# Patient Record
Sex: MALE | Race: OTHER | NOT HISPANIC OR LATINO | Employment: OTHER | URBAN - METROPOLITAN AREA
[De-identification: names, ages, dates, MRNs, and addresses within clinical notes are randomized per-mention and may not be internally consistent; named-entity substitution may affect disease eponyms.]

---

## 2017-12-05 ENCOUNTER — OFFICE VISIT (OUTPATIENT)
Dept: INTERNAL MEDICINE | Facility: CLINIC | Age: 47
End: 2017-12-05

## 2017-12-05 VITALS
HEIGHT: 66 IN | SYSTOLIC BLOOD PRESSURE: 143 MMHG | TEMPERATURE: 98 F | BODY MASS INDEX: 34.9 KG/M2 | HEART RATE: 63 BPM | WEIGHT: 217.13 LBS | DIASTOLIC BLOOD PRESSURE: 85 MMHG

## 2017-12-05 DIAGNOSIS — R06.2 WHEEZING: ICD-10-CM

## 2017-12-05 DIAGNOSIS — I10 ESSENTIAL HYPERTENSION: ICD-10-CM

## 2017-12-05 DIAGNOSIS — Z72.0 TOBACCO USE: ICD-10-CM

## 2017-12-05 DIAGNOSIS — Z00.00 ANNUAL PHYSICAL EXAM: Primary | ICD-10-CM

## 2017-12-05 PROCEDURE — 99386 PREV VISIT NEW AGE 40-64: CPT | Mod: S$PBB,,, | Performed by: INTERNAL MEDICINE

## 2017-12-05 PROCEDURE — 99999 PR PBB SHADOW E&M-NEW PATIENT-LVL III: CPT | Mod: PBBFAC,,, | Performed by: INTERNAL MEDICINE

## 2017-12-05 PROCEDURE — 99203 OFFICE O/P NEW LOW 30 MIN: CPT | Mod: PBBFAC,PO | Performed by: INTERNAL MEDICINE

## 2017-12-05 RX ORDER — AMLODIPINE BESYLATE 5 MG/1
TABLET ORAL
Refills: 0 | COMMUNITY
Start: 2017-11-30 | End: 2017-12-05 | Stop reason: DRUGHIGH

## 2017-12-05 RX ORDER — AMLODIPINE BESYLATE 10 MG/1
10 TABLET ORAL DAILY
Qty: 30 TABLET | Refills: 11 | Status: SHIPPED | OUTPATIENT
Start: 2017-12-05 | End: 2018-01-05 | Stop reason: SDUPTHER

## 2017-12-05 RX ORDER — IBUPROFEN 600 MG/1
TABLET ORAL
Refills: 0 | COMMUNITY
Start: 2017-11-30 | End: 2019-11-26

## 2017-12-05 NOTE — PATIENT INSTRUCTIONS
HYPERTENSION (High Blood Pressure)  High blood pressure is one of the silent killers.  It usually causes no symptoms, but it can cause serious problems if left untreated--such as stroke, heart failure, heart attack, vision problems and kidney failure.    Lifestyle Changes are FUNDAMENTAL!  *Lower your salt intake.  Ideally <2g (2000mg) per day, but any amount of decrease will help.  Don't smoke cigarettes or use any tobacco product.  Lose weight if you're overweight.  Exercise >30 min per day most days of the week  Eat a healthy diet that includes lots of fruits and vegetables and is low in fat.  Limit your alcohol and caffeine intake.    Blood pressure monitor  [employer] brand monitors are consistently good, but whichever brand you get, I recommend the arm monitor, not the wrist kind (tend to be inaccurate).  Available in any local drugstore.      Monitoring Your Blood Pressure    Both numbers are important, so if 1 number is too high, we should adjust your medication.  Keep a log of the pressures and what time of day you took them.  Even if you've been doing well for a while, still check maybe once a week just to make sure since changes can occur at any time.      Don't Forget Your Eye Exam!  *Make sure to get a dilated Eye Exam yearly to check for any damage from high blood pressure.  Don't wait till you have vision problems, or it may be much harder or even impossible to treat at that point!        Let me know if you would be interested in [facility]'s digital HTN program. You can find out more information here: [email or web address]

## 2017-12-05 NOTE — PROGRESS NOTES
Subjective:       Patient ID: Jasper Sarabia is a 47 y.o. male.    Chief Complaint: Annual Exam    HPI   Started on amlodipine 5mg at [facility] about 3 days ago.     47 y.o. male here for annual exam.     Lipid disorders/ASCVD risk (ages >/= 45 or >/= 20 if increased risk ): labs recently performed- will review records    DM (>45y yearly or if obese, HTN): A1c  labs recently performed- will review records    Eye exam: 2 years ago - due- pt will schedule    Vaccines:   Influenza (yearly) declines   Tetanus (every 10 yrs - 1st tdap) pt unsure    PPSV23(>64yo or <65 w/ lung dz, smoking, DM) declines       Exercise: soccer, gym  Diet: varied       Past Medical History:   Diagnosis Date    Hypertension      Past Surgical History:   Procedure Laterality Date    BACK SURGERY  1997     Family History   Problem Relation Age of Onset    Parkinsonism Mother      Social History     Social History    Marital status:      Spouse name: N/A    Number of children: N/A    Years of education: N/A     Occupational History    Not on file.     Social History Main Topics    Smoking status: Current Every Day Smoker     Packs/day: 1.00     Years: 15.00    Smokeless tobacco: Never Used    Alcohol use Yes      Comment: 2 drinks per day    Drug use: No    Sexual activity: Not on file     Other Topics Concern    Not on file     Social History Narrative    Owns [employer]     Review of patient's allergies indicates:  No Known Allergies    Current Outpatient Prescriptions:     ibuprofen (ADVIL,MOTRIN) 600 MG tablet, TK 1 T PO  Q 8 H PRN, Disp: , Rfl: 0    amLODIPine (NORVASC) 10 MG tablet, Take 1 tablet (10 mg total) by mouth once daily., Disp: 30 tablet, Rfl: 11          Review of Systems   Constitutional: Negative for fatigue and unexpected weight change.   HENT: Negative for dental problem, sinus pain and trouble swallowing.    Eyes: Negative for discharge and redness.   Respiratory: Negative for cough and  "shortness of breath.    Cardiovascular: Negative for chest pain and leg swelling.   Gastrointestinal: Negative for abdominal pain, blood in stool, constipation and diarrhea.   Genitourinary: Negative for difficulty urinating, dysuria and frequency.   Musculoskeletal: Negative for gait problem and joint swelling.   Skin: Negative for pallor, rash and wound.   Neurological: Negative for numbness and headaches.       Objective:        Vitals:    12/05/17 0959   BP: (!) 143/85   BP Location: Right arm   Patient Position: Sitting   BP Method: Medium (Automatic)   Pulse: 63   Temp: 97.7 °F (36.5 °C)   TempSrc: Oral   Weight: 98.5 kg (217 lb 2.5 oz)   Height: 5' 6" (1.676 m)       Body mass index is 35.05 kg/m².    Physical Exam   Constitutional: He is oriented to person, place, and time. He appears well-developed. No distress.   HENT:   Head: Normocephalic and atraumatic.   Right Ear: Tympanic membrane normal.   Left Ear: Tympanic membrane normal.   Nose: Nose normal.   Mouth/Throat: Oropharynx is clear and moist.   Eyes: Conjunctivae and EOM are normal. Pupils are equal, round, and reactive to light.   Neck: Normal range of motion. Neck supple. No tracheal deviation present. No thyromegaly present.   Cardiovascular: Normal rate, regular rhythm, normal heart sounds and intact distal pulses.    Pulmonary/Chest: Effort normal. He has wheezes in the right upper field, the right middle field, the right lower field, the left upper field, the left middle field and the left lower field.   Abdominal: Soft. He exhibits no distension and no mass. There is no tenderness.   Musculoskeletal: Normal range of motion. He exhibits no edema.   Lymphadenopathy:     He has no cervical adenopathy.   Neurological: He is alert and oriented to person, place, and time. No sensory deficit.   Skin: Skin is warm and dry. He is not diaphoretic. No cyanosis. Nails show no clubbing.   Psychiatric: He has a normal mood and affect. His behavior is " normal. Judgment normal.       Assessment:     1. Annual physical exam    2. Essential hypertension    3. Wheezing    4. Tobacco use           Plan:         1. Annual physical exam  - declines vaccines  - will review labs recently performed at [facility] - may still need lipid panel, A1c, CMP    2. Essential hypertension  - stop smoking, resume exercise, low salt diet.   - BP log given. Pt will let me know if persistently >130/80. RTC 4 weeks with BP log.  - information about HTN and [facility] digital monitoring program provided w/ AVS  - amLODIPine (NORVASC) 10 MG tablet; Take 1 tablet (10 mg total) by mouth once daily.  Dispense: 30 tablet; Refill: 11    3. Wheezing  - pt asymptomatic but diffuse wheezing - will get PFTs to eval lung etiology vs upper airway etiology (vocal cord dysfxn)  - Complete PFT with bronchodilator; Future  - PULSE OXIMETRY WITH REST - PULM; Future    4. Tobacco use  - discussed stop smoking w/ pt - he declines smoking cessation referral  - Complete PFT with bronchodilator; Future  - PULSE OXIMETRY WITH REST - PULM; Future      RTC 4 wks w/ BP log

## 2018-01-05 ENCOUNTER — OFFICE VISIT (OUTPATIENT)
Dept: INTERNAL MEDICINE | Facility: CLINIC | Age: 48
End: 2018-01-05

## 2018-01-05 VITALS
TEMPERATURE: 98 F | SYSTOLIC BLOOD PRESSURE: 144 MMHG | WEIGHT: 216.5 LBS | BODY MASS INDEX: 34.79 KG/M2 | RESPIRATION RATE: 16 BRPM | HEIGHT: 66 IN | HEART RATE: 77 BPM | DIASTOLIC BLOOD PRESSURE: 88 MMHG

## 2018-01-05 DIAGNOSIS — I10 ESSENTIAL HYPERTENSION: Primary | ICD-10-CM

## 2018-01-05 DIAGNOSIS — R06.2 WHEEZING: ICD-10-CM

## 2018-01-05 PROCEDURE — 99999 PR PBB SHADOW E&M-EST. PATIENT-LVL III: CPT | Mod: PBBFAC,,, | Performed by: INTERNAL MEDICINE

## 2018-01-05 PROCEDURE — 99213 OFFICE O/P EST LOW 20 MIN: CPT | Mod: PBBFAC,PO | Performed by: INTERNAL MEDICINE

## 2018-01-05 PROCEDURE — 99213 OFFICE O/P EST LOW 20 MIN: CPT | Mod: S$PBB,,, | Performed by: INTERNAL MEDICINE

## 2018-01-05 RX ORDER — AMLODIPINE BESYLATE 10 MG/1
10 TABLET ORAL DAILY
Qty: 90 TABLET | Refills: 3 | Status: SHIPPED | OUTPATIENT
Start: 2018-01-05 | End: 2019-11-26

## 2019-11-26 ENCOUNTER — OFFICE VISIT (OUTPATIENT)
Dept: INTERNAL MEDICINE | Facility: CLINIC | Age: 49
End: 2019-11-26
Payer: COMMERCIAL

## 2019-11-26 VITALS
HEART RATE: 66 BPM | WEIGHT: 224.44 LBS | OXYGEN SATURATION: 98 % | HEIGHT: 66 IN | SYSTOLIC BLOOD PRESSURE: 145 MMHG | DIASTOLIC BLOOD PRESSURE: 80 MMHG | BODY MASS INDEX: 36.07 KG/M2

## 2019-11-26 DIAGNOSIS — Z76.89 ESTABLISHING CARE WITH NEW DOCTOR, ENCOUNTER FOR: Primary | ICD-10-CM

## 2019-11-26 DIAGNOSIS — I10 ESSENTIAL HYPERTENSION: ICD-10-CM

## 2019-11-26 PROCEDURE — 3079F DIAST BP 80-89 MM HG: CPT | Mod: CPTII,S$GLB,, | Performed by: STUDENT IN AN ORGANIZED HEALTH CARE EDUCATION/TRAINING PROGRAM

## 2019-11-26 PROCEDURE — 99999 PR PBB SHADOW E&M-EST. PATIENT-LVL III: ICD-10-PCS | Mod: PBBFAC,,, | Performed by: STUDENT IN AN ORGANIZED HEALTH CARE EDUCATION/TRAINING PROGRAM

## 2019-11-26 PROCEDURE — 3077F PR MOST RECENT SYSTOLIC BLOOD PRESSURE >= 140 MM HG: ICD-10-PCS | Mod: CPTII,S$GLB,, | Performed by: STUDENT IN AN ORGANIZED HEALTH CARE EDUCATION/TRAINING PROGRAM

## 2019-11-26 PROCEDURE — 99396 PREV VISIT EST AGE 40-64: CPT | Mod: S$GLB,,, | Performed by: STUDENT IN AN ORGANIZED HEALTH CARE EDUCATION/TRAINING PROGRAM

## 2019-11-26 PROCEDURE — 3077F SYST BP >= 140 MM HG: CPT | Mod: CPTII,S$GLB,, | Performed by: STUDENT IN AN ORGANIZED HEALTH CARE EDUCATION/TRAINING PROGRAM

## 2019-11-26 PROCEDURE — 99999 PR PBB SHADOW E&M-EST. PATIENT-LVL III: CPT | Mod: PBBFAC,,, | Performed by: STUDENT IN AN ORGANIZED HEALTH CARE EDUCATION/TRAINING PROGRAM

## 2019-11-26 PROCEDURE — 99396 PR PREVENTIVE VISIT,EST,40-64: ICD-10-PCS | Mod: S$GLB,,, | Performed by: STUDENT IN AN ORGANIZED HEALTH CARE EDUCATION/TRAINING PROGRAM

## 2019-11-26 PROCEDURE — 3079F PR MOST RECENT DIASTOLIC BLOOD PRESSURE 80-89 MM HG: ICD-10-PCS | Mod: CPTII,S$GLB,, | Performed by: STUDENT IN AN ORGANIZED HEALTH CARE EDUCATION/TRAINING PROGRAM

## 2019-11-26 RX ORDER — LOSARTAN POTASSIUM 50 MG/1
25 TABLET ORAL DAILY
Qty: 45 TABLET | Refills: 3 | Status: SHIPPED | OUTPATIENT
Start: 2019-11-26 | End: 2020-11-20 | Stop reason: SDUPTHER

## 2019-11-26 NOTE — PATIENT INSTRUCTIONS
FASTING LABS (LIPID & A1C)    METABOLIC PANEL IN 5-6 WEEKS    FOLLOW UP APPOINTMENT IN 7 WEEKS WITH DR. VEGA

## 2019-11-26 NOTE — PROGRESS NOTES
Clinic Note  11/26/2019      Subjective:       Patient ID: Jasper Sarabia is a 49 y.o. male being seen to establish care    Chief Complaint: Establish Care    Patient is 49 year old male with history of HTN (not on medication, but previously on amlodipine) and stroke in 2018 (with no residual deficits, possibly hemorrhagic) s/p endovascular ?repair/coiling and then open brain surgery 2019 for shunt placement. All procedures performed at [facility] and thus there are no documents available for review at this time. Patient has no complaints today and is transitioning care to [facility] from [facility]. Denies chest pain, shortness of breath, nausea, vomiting, bowel habit changes or residual deficits from CVA.    #HTN- patient was previously on amlodipine but stopped taking it due to dizziness associated with standing (likely orthostatic hypotension)    #Smoking- patient has approx 20 pack year history of smoking and quit in 2016.     #Diet and exercise- patient eats 2 meals a day and limits carbohydrates to only breakfast. He walks daily (60283 step daily, per Apple Watch), but has no formal exercise plan.           Past Medical History:   Diagnosis Date    Hemorrhagic stroke 2018    Hypertension        Past Surgical History:   Procedure Laterality Date    BACK SURGERY  1997    BRAIN SURGERY  2018    endovascular repair of aneurysm    BRAIN SURGERY  2019    shunt placement, open surgery       Family History   Problem Relation Age of Onset    Parkinsonism Mother     Diabetes Sister        Social History     Socioeconomic History    Marital status:      Spouse name: Not on file    Number of children: Not on file    Years of education: Not on file    Highest education level: Not on file   Occupational History    Occupation: Self-employed, [employer]   Social Needs    Financial resource strain: Not on file    Food insecurity:     Worry: Not on file     Inability: Not on file    Transportation needs:     Medical: Not  on file     Non-medical: Not on file   Tobacco Use    Smoking status: Former Smoker     Packs/day: 1.00     Years: 20.00     Pack years: 20.00    Smokeless tobacco: Never Used    Tobacco comment: quit 2016   Substance and Sexual Activity    Alcohol use: Yes     Frequency: 2-4 times a month     Comment: 2 drinks per day    Drug use: No    Sexual activity: Not on file   Lifestyle    Physical activity:     Days per week: Not on file     Minutes per session: Not on file    Stress: Not on file   Relationships    Social connections:     Talks on phone: Not on file     Gets together: Not on file     Attends Anglican service: Not on file     Active member of club or organization: Not on file     Attends meetings of clubs or organizations: Not on file     Relationship status: Not on file   Other Topics Concern    Not on file   Social History Narrative    Owns [employer]       Patient's Medications   New Prescriptions    LOSARTAN (COZAAR) 50 MG TABLET    Take 0.5 tablets (25 mg total) by mouth once daily.   Previous Medications    No medications on file   Modified Medications    No medications on file   Discontinued Medications    AMLODIPINE (NORVASC) 10 MG TABLET    Take 1 tablet (10 mg total) by mouth once daily.    IBUPROFEN (ADVIL,MOTRIN) 600 MG TABLET    TK 1 T PO  Q 8 H PRN       Patient Active Problem List    Diagnosis Date Noted    Essential hypertension 12/05/2017    Tobacco use 12/05/2017    Wheezing 12/05/2017       Review of Systems   Constitutional: Negative for chills, fever, malaise/fatigue and weight loss.   Respiratory: Negative for cough and shortness of breath.    Cardiovascular: Negative for chest pain and leg swelling.   Gastrointestinal: Negative for abdominal pain, constipation, diarrhea, heartburn, nausea and vomiting.   Genitourinary: Negative.    Musculoskeletal: Negative.    Neurological: Negative for dizziness, focal weakness, seizures, loss of consciousness, weakness and  "headaches.   Psychiatric/Behavioral: Negative.            Objective:      BP (!) 145/80 (BP Location: Left arm, Patient Position: Sitting, BP Method: Large (Manual))   Pulse 66   Ht 5' 6" (1.676 m)   Wt 101.8 kg (224 lb 6.9 oz)   SpO2 98%   BMI 36.22 kg/m²   Body mass index is 36.22 kg/m².    Physical Exam   Constitutional: He is oriented to person, place, and time. He appears well-developed and well-nourished. No distress.   HENT:   Patient with coronal surgical scar and R sided cranial shunt placed.    Eyes: Pupils are equal, round, and reactive to light. Conjunctivae are normal.   Cardiovascular: Normal rate, regular rhythm and normal heart sounds.   Pulmonary/Chest: Effort normal and breath sounds normal.   Abdominal: Soft. Bowel sounds are normal. There is no tenderness.   Musculoskeletal: He exhibits no edema or tenderness.   Neurological: He is alert and oriented to person, place, and time.   Psychiatric: He has a normal mood and affect. His behavior is normal.   Nursing note and vitals reviewed.      Assessment:         1. Establishing care with new doctor, encounter for  Lipid panel    Hemoglobin A1c   2. Essential hypertension  losartan (COZAAR) 50 MG tablet    Basic metabolic panel         Plan:         Jasper Sarabia is a 49 y.o. male being seen to establish care.     Jasper was seen today for establish care.    Diagnoses and all orders for this visit:    Establishing care with new doctor, encounter for  -     Lipid panel; Future  -     Hemoglobin A1c; Future  - Return to clinic in ~7 weeks with [facility] desirae and appropriate referral to NSGY vs [facility] for stroke follow up    Essential hypertension  -     losartan (COZAAR) 50 MG tablet; Take 0.5 tablets (25 mg total) by mouth once daily.  -     Basic metabolic panel; prior to next visit          Patient seen and plan of care discussed with Dr. Small.     Ita Garcia MD    I have discussed A/P with Dr Garcia and agree with plan of " action.  Clement Cifuentes

## 2019-12-02 ENCOUNTER — LAB VISIT (OUTPATIENT)
Dept: LAB | Facility: HOSPITAL | Age: 49
End: 2019-12-02
Attending: STUDENT IN AN ORGANIZED HEALTH CARE EDUCATION/TRAINING PROGRAM
Payer: COMMERCIAL

## 2019-12-02 ENCOUNTER — TELEPHONE (OUTPATIENT)
Dept: CARDIOLOGY | Facility: HOSPITAL | Age: 49
End: 2019-12-02

## 2019-12-02 DIAGNOSIS — I10 ESSENTIAL HYPERTENSION: ICD-10-CM

## 2019-12-02 DIAGNOSIS — E78.5 HYPERLIPIDEMIA ASSOCIATED WITH TYPE 2 DIABETES MELLITUS: ICD-10-CM

## 2019-12-02 DIAGNOSIS — E11.69 HYPERLIPIDEMIA ASSOCIATED WITH TYPE 2 DIABETES MELLITUS: ICD-10-CM

## 2019-12-02 DIAGNOSIS — Z76.89 ESTABLISHING CARE WITH NEW DOCTOR, ENCOUNTER FOR: ICD-10-CM

## 2019-12-02 DIAGNOSIS — E11.9 DIABETES MELLITUS WITHOUT COMPLICATION: Primary | ICD-10-CM

## 2019-12-02 LAB
ANION GAP SERPL CALC-SCNC: 9 MMOL/L (ref 8–16)
BUN SERPL-MCNC: 14 MG/DL (ref 6–20)
CALCIUM SERPL-MCNC: 9.6 MG/DL (ref 8.7–10.5)
CHLORIDE SERPL-SCNC: 108 MMOL/L (ref 95–110)
CHOLEST SERPL-MCNC: 217 MG/DL (ref 120–199)
CHOLEST/HDLC SERPL: 5.4 {RATIO} (ref 2–5)
CO2 SERPL-SCNC: 23 MMOL/L (ref 23–29)
CREAT SERPL-MCNC: 0.8 MG/DL (ref 0.5–1.4)
EST. GFR  (AFRICAN AMERICAN): >60 ML/MIN/1.73 M^2
EST. GFR  (NON AFRICAN AMERICAN): >60 ML/MIN/1.73 M^2
ESTIMATED AVG GLUCOSE: 200 MG/DL (ref 68–131)
GLUCOSE SERPL-MCNC: 185 MG/DL (ref 70–110)
HBA1C MFR BLD HPLC: 8.6 % (ref 4–5.6)
HDLC SERPL-MCNC: 40 MG/DL (ref 40–75)
HDLC SERPL: 18.4 % (ref 20–50)
LDLC SERPL CALC-MCNC: 115 MG/DL (ref 63–159)
NONHDLC SERPL-MCNC: 177 MG/DL
POTASSIUM SERPL-SCNC: 4 MMOL/L (ref 3.5–5.1)
SODIUM SERPL-SCNC: 140 MMOL/L (ref 136–145)
TRIGL SERPL-MCNC: 310 MG/DL (ref 30–150)

## 2019-12-02 PROCEDURE — 36415 COLL VENOUS BLD VENIPUNCTURE: CPT

## 2019-12-02 PROCEDURE — 83036 HEMOGLOBIN GLYCOSYLATED A1C: CPT

## 2019-12-02 PROCEDURE — 80048 BASIC METABOLIC PNL TOTAL CA: CPT

## 2019-12-02 PROCEDURE — 80061 LIPID PANEL: CPT

## 2019-12-02 RX ORDER — ATORVASTATIN CALCIUM 40 MG/1
40 TABLET, FILM COATED ORAL DAILY
Qty: 90 TABLET | Refills: 3 | Status: SHIPPED | OUTPATIENT
Start: 2019-12-02 | End: 2020-01-14 | Stop reason: SDUPTHER

## 2019-12-02 RX ORDER — METFORMIN HYDROCHLORIDE 500 MG/1
500 TABLET, EXTENDED RELEASE ORAL
Qty: 90 TABLET | Refills: 3 | Status: SHIPPED | OUTPATIENT
Start: 2019-12-02 | End: 2021-04-14 | Stop reason: SDUPTHER

## 2019-12-02 NOTE — TELEPHONE ENCOUNTER
Patient with A1C 8.6% and ASCVD risk score of 10.4% of cardiovascular event in the next 10 years.     Will start patient on metformin XR 500mg daily and atorvastatin 40mg daily. Will also order diabetic education and DME diabetic supplies for patient.    Already have scheduled follow up with patient to establish care, will reassess lipid profile and A1C at that time. Called patient to report results and new medications. Patient expressed understanding. Will keep next appointment for January. Patient encouraged to call in with any further questions or concerns.     BOOGIE Garcia MD

## 2020-01-14 ENCOUNTER — OFFICE VISIT (OUTPATIENT)
Dept: INTERNAL MEDICINE | Facility: CLINIC | Age: 50
End: 2020-01-14
Payer: COMMERCIAL

## 2020-01-14 VITALS
OXYGEN SATURATION: 98 % | BODY MASS INDEX: 40.24 KG/M2 | DIASTOLIC BLOOD PRESSURE: 66 MMHG | HEIGHT: 64 IN | HEART RATE: 71 BPM | SYSTOLIC BLOOD PRESSURE: 148 MMHG | WEIGHT: 235.69 LBS

## 2020-01-14 DIAGNOSIS — E11.69 HYPERLIPIDEMIA ASSOCIATED WITH TYPE 2 DIABETES MELLITUS: ICD-10-CM

## 2020-01-14 DIAGNOSIS — E78.5 HYPERLIPIDEMIA ASSOCIATED WITH TYPE 2 DIABETES MELLITUS: ICD-10-CM

## 2020-01-14 DIAGNOSIS — Z98.890 HISTORY OF BRAIN SURGERY: ICD-10-CM

## 2020-01-14 DIAGNOSIS — K21.9 GASTROESOPHAGEAL REFLUX DISEASE, ESOPHAGITIS PRESENCE NOT SPECIFIED: ICD-10-CM

## 2020-01-14 DIAGNOSIS — E11.9 DIABETES MELLITUS WITHOUT COMPLICATION: Primary | ICD-10-CM

## 2020-01-14 PROCEDURE — 99213 PR OFFICE/OUTPT VISIT, EST, LEVL III, 20-29 MIN: ICD-10-PCS | Mod: S$GLB,,, | Performed by: STUDENT IN AN ORGANIZED HEALTH CARE EDUCATION/TRAINING PROGRAM

## 2020-01-14 PROCEDURE — 3008F BODY MASS INDEX DOCD: CPT | Mod: CPTII,S$GLB,, | Performed by: STUDENT IN AN ORGANIZED HEALTH CARE EDUCATION/TRAINING PROGRAM

## 2020-01-14 PROCEDURE — 3078F PR MOST RECENT DIASTOLIC BLOOD PRESSURE < 80 MM HG: ICD-10-PCS | Mod: CPTII,S$GLB,, | Performed by: STUDENT IN AN ORGANIZED HEALTH CARE EDUCATION/TRAINING PROGRAM

## 2020-01-14 PROCEDURE — 3078F DIAST BP <80 MM HG: CPT | Mod: CPTII,S$GLB,, | Performed by: STUDENT IN AN ORGANIZED HEALTH CARE EDUCATION/TRAINING PROGRAM

## 2020-01-14 PROCEDURE — 3077F SYST BP >= 140 MM HG: CPT | Mod: CPTII,S$GLB,, | Performed by: STUDENT IN AN ORGANIZED HEALTH CARE EDUCATION/TRAINING PROGRAM

## 2020-01-14 PROCEDURE — 3077F PR MOST RECENT SYSTOLIC BLOOD PRESSURE >= 140 MM HG: ICD-10-PCS | Mod: CPTII,S$GLB,, | Performed by: STUDENT IN AN ORGANIZED HEALTH CARE EDUCATION/TRAINING PROGRAM

## 2020-01-14 PROCEDURE — 99213 OFFICE O/P EST LOW 20 MIN: CPT | Mod: S$GLB,,, | Performed by: STUDENT IN AN ORGANIZED HEALTH CARE EDUCATION/TRAINING PROGRAM

## 2020-01-14 PROCEDURE — 99999 PR PBB SHADOW E&M-EST. PATIENT-LVL IV: CPT | Mod: PBBFAC,,, | Performed by: STUDENT IN AN ORGANIZED HEALTH CARE EDUCATION/TRAINING PROGRAM

## 2020-01-14 PROCEDURE — 3008F PR BODY MASS INDEX (BMI) DOCUMENTED: ICD-10-PCS | Mod: CPTII,S$GLB,, | Performed by: STUDENT IN AN ORGANIZED HEALTH CARE EDUCATION/TRAINING PROGRAM

## 2020-01-14 PROCEDURE — 99999 PR PBB SHADOW E&M-EST. PATIENT-LVL IV: ICD-10-PCS | Mod: PBBFAC,,, | Performed by: STUDENT IN AN ORGANIZED HEALTH CARE EDUCATION/TRAINING PROGRAM

## 2020-01-14 RX ORDER — PANTOPRAZOLE SODIUM 40 MG/1
40 TABLET, DELAYED RELEASE ORAL DAILY
Qty: 90 TABLET | Refills: 3 | Status: SHIPPED | OUTPATIENT
Start: 2020-01-14 | End: 2023-11-16

## 2020-01-14 RX ORDER — ATORVASTATIN CALCIUM 40 MG/1
40 TABLET, FILM COATED ORAL DAILY
Qty: 90 TABLET | Refills: 3 | Status: SHIPPED | OUTPATIENT
Start: 2020-01-14 | End: 2023-11-16

## 2020-01-14 NOTE — PATIENT INSTRUCTIONS
Low-Salt Diet  This diet removes foods that are high in salt. It also limits the amount of salt you use when cooking. It is most often used for people with high blood pressure, edema (fluid retention), and kidney, liver, or heart disease.  Table salt contains the mineral sodium. Your body needs sodium to work normally. But too much sodium can make your health problems worse. Your healthcare provider is recommending a low-salt (also called low-sodium) diet for you. Your total daily allowance of salt is 1,500 to 2,300 milligrams (mg). It is less than 1 teaspoon of table salt. This means you can have only about 500 to 700 mg of sodium at each meal. People with certain health problems should limit salt intake to the lower end of the recommended range.    When you cook, dont add much salt. If you can cook without using salt, even better. Dont add salt to your food at the table.  When shopping, read food labels. Salt is often called sodium on the label. Choose foods that are salt-free, low salt, or very low salt. Note that foods with reduced salt may not lower your salt intake enough.    Beans, potatoes, and pasta  Ok: Dry beans, split peas, lentils, potatoes, rice, macaroni, pasta, spaghetti without added salt  Avoid: Potato chips, tortilla chips, and similar products  Breads and cereals  Ok: Low-sodium breads, rolls, cereals, and cakes; low-salt crackers, matzo crackers  Avoid: Salted crackers, pretzels, popcorn, Albanian toast, pancakes, muffins  Dairy  Ok: Milk, chocolate milk, hot chocolate mix, low-salt cheeses, and yogurt  Avoid: Processed cheese and cheese spreads; Roquefort, Camembert, and cottage cheese; buttermilk, instant breakfast drink  Desserts  Ok: Ice cream, frozen yogurt, juice bars, gelatin, cookies and pies, sugar, honey, jelly, hard candy  Avoid: Most pies, cakes and cookies prepared or processed with salt; instant pudding  Drinks  Ok: Tea, coffee, fizzy (carbonated) drinks, juices  Avoid: Flavored  coffees, electrolyte replacement drinks, sports drinks  Meats  Ok: All fresh meat, fish, poultry, low-salt tuna, eggs, egg substitute  Avoid: Smoked, pickled, brine-cured, or salted meats and fish. This includes brambila, chipped beef, corned beef, hot dogs, deli meats, ham, kosher meats, salt pork, sausage, canned tuna, salted codfish, smoked salmon, herring, sardines, or anchovies.  Seasonings and spices  Ok: Most seasonings are okay. Good substitutes for salt include: fresh herb blends, hot sauce, lemon, garlic, roe, vinegar, dry mustard, parsley, cilantro, horseradish, tomato paste, regular margarine, mayonnaise, unsalted butter, cream cheese, vegetable oil, cream, low-salt salad dressing and gravy.  Avoid: Regular ketchup, relishes, pickles, soy sauce, teriyaki sauce, Worcestershire sauce, BBQ sauce, tartar sauce, meat tenderizer, chili sauce, regular gravy, regular salad dressing, salted butter  Soups  Ok: Low-salt soups and broths made with allowed foods  Avoid: Bouillon cubes, soups with smoked or salted meats, regular soup and broth  Vegetables  Ok: Most vegetables are okay; also low-salt tomato and vegetable juices  Avoid: Sauerkraut and other brine-soaked vegetables; pickles and other pickled vegetables; tomato juice, olives  Date Last Reviewed: 8/1/2016 © 2000-2017 [employer]. [address] [place] [address]. All rights reserved. This information is not intended as a substitute for professional medical care. Always follow your healthcare professional's instructions.        Eating Heart-Healthy Foods  Eating has a big impact on your heart health. In fact, eating healthier can improve several of your heart risks at once. For instance, it helps you manage weight, cholesterol, and blood pressure. Here are ideas to help you make heart-healthy changes without giving up all the foods and flavors you love.  Getting started  · Talk with your health care provider about eating plans, such  as the DASH or Mediterranean diet. You may also be referred to a dietitian.  · Change a few things at a time. Give yourself time to get used to a few eating changes before adding more.  · Work to create a tasty, healthy eating plan that you can stick to for the rest of your life.    Goals for healthy eating  Below are some tips to improve your eating habits:  · Limit saturated fats and trans fats. Saturated fats raise your levels of cholesterol, so keep these fats to a minimum. They are found in foods such as fatty meats, whole milk, cheese, and palm and coconut oils. Avoid trans fats because they lower good cholesterol as well as raise bad cholesterol. Trans fats are most often found in processed foods.  · Reduce sodium (salt) intake. Eating too much salt may increase your blood pressure. Limit your sodium intake to 2,300 milligrams (mg) per day, or less if your health care provider recommends it. Dining out less often and eating fewer processed foods are two great ways to decrease the amount of salt you consume.  · Managing calories. A calorie is a unit of energy. Your body burns calories for fuel, but if you eat more calories than your body burns, the extras are stored as fat. Your health care provider can help you create a diet plan to manage your calories. This will likely include eating healthier foods as well as exercising regularly. To help you track your progress, keep a diary to record what you eat and how often you exercise.  Choose the right foods  Aim to make these foods staples of your diet. If you have diabetes, you may have different recommendations than what is listed here:  · Fruits and vegetable provide plenty of nutrients without a lot of calories. At meals, fill half your plate with these foods. Split the other half of your plate between whole grains and lean protein.  · Whole grains are high in fiber and rich in vitamins and nutrients. Good choices include whole-wheat bread, pasta, and brown  rice.  · Lean proteins give you nutrition with less fat. Good choices include fish, skinless chicken, and beans.  · Low-fat or nonfat dairy provides nutrients without a lot of fat. Try low-fat or nonfat milk, cheese, or yogurt.  · Healthy fats can be good for you in small amounts. These are unsaturated fats, such as olive oil, nuts, and fish. Try to have at least 2 servings per week of fatty fish such as salmon, sardines, mackerel, rainbow trout, and albacore tuna. These contain omega-3 fatty acids, which are good for your heart. Flaxseed is another source of a heart-healthy fat.  More on heart healthy eating    Read food labels  Healthy eating starts at the grocery store. Be sure to pay attention to food labels on packaged foods. Look for products that are high in fiber and protein, and low in saturated fat, cholesterol, and sodium. Avoid products that contain trans fat. And pay close attention to serving size. For instance, if you plan to eat two servings, double all the numbers on the label.  Prepare food right  A key part of healthy cooking is cutting down on added fat and salt. Look on the internet for lower-fat, lower-sodium recipes. Also, try these tips:  · Remove fat from meat and skin from poultry before cooking.  · Skim fat from the surface of soups and sauces.  · Broil, boil, bake, steam, grill, and microwave food without added fats.  · Choose ingredients that spice up your food without adding calories, fat, or sodium. Try these items: horseradish, hot sauce, lemon, mustard, nonfat salad dressings, and vinegar. For salt-free herbs and spices, try basil, cilantro, cinnamon, pepper, and rosemary.  Date Last Reviewed: 6/25/2015  © 1220-2517 [employer]. [address] [place] [address], [place] [address]. All rights reserved. This information is not intended as a substitute for professional medical care. Always follow your healthcare professional's instructions.

## 2020-01-14 NOTE — PROGRESS NOTES
Clinic Note  01/14/2020      Subjective:       Patient ID: Jasper Sarabia is a 49 y.o. male being seen for an established visit.    Chief Complaint: Follow-up      49 year old male with HTN, newly diagnosed DM on last visit (A1C 8.6%) and stroke in 2018 without residual deficits (thought to be hemorrhagic) and s/p endovascular repair/coiling and then open shunt placement in 2018 and second procedure in 7/2019 for a second aneurysm found in angiogram, but nothing was found in the procedure (reported by patient). Patient moved his care over to [facility] in 11/2019 from [facility]. Today patient complains of some pain above his R ear, near the sight of a brain surgery.     Had this before, weather, pressure, no sleep. 2/10, related to high BP    #HTN - patient was previously on amlodipine and experienced dizziness. Was placed on losartan 25mg daily at last visit. Today his BP is 144/66. Patient has been logging his blood pressures at home, they have ranged from 120mmHg to 135 mmHg systolic, with very few readings ranging 140-145mmHg. Patient reports being compliant with his medications.    #DM - patient with Hb A1C 8.6% on last visit. He was started on metformin on the last visit and has been compliant with his medications. He has not been to diabetic education at this point and has been checking his blood glucose at home and before he eats and states that it ranges around 100-140. Patient has been walking 3 miles nearly every day. Patient has not been taking his simvastatin because of issues with his insurance.       Review of Systems   Constitutional: Negative for fever.   HENT: Negative for ear discharge, ear pain, facial swelling, hearing loss and mouth sores.         R ear pressure   Eyes: Negative.    Respiratory: Negative for cough and shortness of breath.    Cardiovascular: Negative for chest pain and leg swelling.   Gastrointestinal: Negative for abdominal distention and abdominal pain.   Endocrine: Negative.   "  Genitourinary: Negative.    Musculoskeletal: Negative.    Skin: Negative.    Allergic/Immunologic: Negative.    Neurological: Negative.    Psychiatric/Behavioral: Negative.        Patient's Medications   New Prescriptions    PANTOPRAZOLE (PROTONIX) 40 MG TABLET    Take 1 tablet (40 mg total) by mouth once daily.   Previous Medications    LOSARTAN (COZAAR) 50 MG TABLET    Take 0.5 tablets (25 mg total) by mouth once daily.    METFORMIN (GLUCOPHAGE-XR) 500 MG 24 HR TABLET    Take 1 tablet (500 mg total) by mouth daily with breakfast.   Modified Medications    Modified Medication Previous Medication    ATORVASTATIN (LIPITOR) 40 MG TABLET atorvastatin (LIPITOR) 40 MG tablet       Take 1 tablet (40 mg total) by mouth once daily.    Take 1 tablet (40 mg total) by mouth once daily.   Discontinued Medications    No medications on file       Patient Active Problem List    Diagnosis Date Noted    Diabetes mellitus without complication 12/02/2019    Hyperlipidemia associated with type 2 diabetes mellitus 12/02/2019    Essential hypertension 12/05/2017    Tobacco use 12/05/2017    Wheezing 12/05/2017           Objective:      BP (!) 148/66   Pulse 71   Ht 5' 4" (1.626 m)   Wt 106.9 kg (235 lb 10.8 oz)   SpO2 98%   BMI 40.45 kg/m²   Estimated body mass index is 40.45 kg/m² as calculated from the following:    Height as of this encounter: 5' 4" (1.626 m).    Weight as of this encounter: 106.9 kg (235 lb 10.8 oz).    Physical Exam   Constitutional: He is oriented to person, place, and time. He appears well-developed and well-nourished. No distress.   HENT:   Right Ear: External ear normal.   Left Ear: External ear normal.   Mouth/Throat: No oropharyngeal exudate.   R sided cranial shunt. Healed coronal surgical scar. Bilateral EAM clear with normal TMs.    Eyes: Pupils are equal, round, and reactive to light. Conjunctivae are normal.   Cardiovascular: Normal rate, regular rhythm and normal heart sounds. "   Pulmonary/Chest: Effort normal and breath sounds normal.   Abdominal: Soft. Bowel sounds are normal. There is no tenderness.   Neurological: He is alert and oriented to person, place, and time.   Psychiatric: He has a normal mood and affect. His behavior is normal. Judgment and thought content normal.   Nursing note and vitals reviewed.      Assessment and Plan:   Jasper was seen today for follow-up.    Diagnoses and all orders for this visit:    Diabetes mellitus without complication  -     atorvastatin (LIPITOR) 40 MG tablet; Take 1 tablet (40 mg total) by mouth once daily.  -     Ambulatory consult to Diabetic Education; Future  - Follow up CBC, BMP, A1C in 6 weeks    Hyperlipidemia associated with type 2 diabetes mellitus  -     atorvastatin (LIPITOR) 40 MG tablet; Take 1 tablet (40 mg total) by mouth once daily.    Gastroesophageal reflux disease, esophagitis presence not specified  -     pantoprazole (PROTONIX) 40 MG tablet; Take 1 tablet (40 mg total) by mouth once daily.    History of brain surgery  -     Ambulatory consult to Neurosurgery      Will attempt to obtain patient records from [facility]. Encouraged patient to bring in records/imaging. RTC in 6 weeks.     Patient seen and plan of care discussed with Dr. Davi Garcia MD

## 2020-01-14 NOTE — PROGRESS NOTES
Reviewed patient's medical record in Epic. Patient's history and physical reviewed and discussed, please refer to resident physician's note for specific details. I agree with resident's assessment and plan.    Alex Tomlinson MD

## 2020-02-19 ENCOUNTER — TELEPHONE (OUTPATIENT)
Dept: NEUROSURGERY | Facility: CLINIC | Age: 50
End: 2020-02-19

## 2020-02-19 NOTE — TELEPHONE ENCOUNTER
----- Message from Rose Verma sent at 2/19/2020  9:14 AM CST -----  Contact: pt @ [phone]  Calling to speak with someone in Dr. Patton's regarding his appt. Please call.

## 2020-05-20 ENCOUNTER — TELEPHONE (OUTPATIENT)
Dept: NEUROSURGERY | Facility: CLINIC | Age: 50
End: 2020-05-20

## 2020-05-20 NOTE — TELEPHONE ENCOUNTER
"----- Message from Raisa Nolen sent at 5/20/2020  1:30 PM CDT -----  Patient Assist/ Scheduling     Name of caller:  Jasper   Provider name: Abdi DE ANDA MD   Contact Preference:  [phone]  Is this regarding current patient or new patient?: current   What is the nature of the call?    - pt states that he has been experiencing headaches lately,   some coughing and shortness of breathe. Last appt were   cancelled and will need to be r/s Please call and advise.     Additional Notes:   "Thank you for all that you do for our patients'"     "

## 2020-06-04 ENCOUNTER — OFFICE VISIT (OUTPATIENT)
Dept: NEUROSURGERY | Facility: CLINIC | Age: 50
End: 2020-06-04
Payer: COMMERCIAL

## 2020-06-04 VITALS
HEIGHT: 64 IN | DIASTOLIC BLOOD PRESSURE: 93 MMHG | TEMPERATURE: 98 F | SYSTOLIC BLOOD PRESSURE: 155 MMHG | BODY MASS INDEX: 36.7 KG/M2 | HEART RATE: 64 BPM | WEIGHT: 215 LBS

## 2020-06-04 DIAGNOSIS — I60.8 SUBARACHNOID HEMORRHAGE DUE TO CEREBRAL ANEURYSM: Primary | ICD-10-CM

## 2020-06-04 PROCEDURE — 99204 PR OFFICE/OUTPT VISIT, NEW, LEVL IV, 45-59 MIN: ICD-10-PCS | Mod: S$GLB,,, | Performed by: NEUROLOGICAL SURGERY

## 2020-06-04 PROCEDURE — 3008F PR BODY MASS INDEX (BMI) DOCUMENTED: ICD-10-PCS | Mod: CPTII,S$GLB,, | Performed by: NEUROLOGICAL SURGERY

## 2020-06-04 PROCEDURE — 99204 OFFICE O/P NEW MOD 45 MIN: CPT | Mod: S$GLB,,, | Performed by: NEUROLOGICAL SURGERY

## 2020-06-04 PROCEDURE — 3008F BODY MASS INDEX DOCD: CPT | Mod: CPTII,S$GLB,, | Performed by: NEUROLOGICAL SURGERY

## 2020-06-04 PROCEDURE — 99999 PR PBB SHADOW E&M-EST. PATIENT-LVL III: ICD-10-PCS | Mod: PBBFAC,,, | Performed by: NEUROLOGICAL SURGERY

## 2020-06-04 PROCEDURE — 3077F SYST BP >= 140 MM HG: CPT | Mod: CPTII,S$GLB,, | Performed by: NEUROLOGICAL SURGERY

## 2020-06-04 PROCEDURE — 99999 PR PBB SHADOW E&M-EST. PATIENT-LVL III: CPT | Mod: PBBFAC,,, | Performed by: NEUROLOGICAL SURGERY

## 2020-06-04 PROCEDURE — 3080F PR MOST RECENT DIASTOLIC BLOOD PRESSURE >= 90 MM HG: ICD-10-PCS | Mod: CPTII,S$GLB,, | Performed by: NEUROLOGICAL SURGERY

## 2020-06-04 PROCEDURE — 3077F PR MOST RECENT SYSTOLIC BLOOD PRESSURE >= 140 MM HG: ICD-10-PCS | Mod: CPTII,S$GLB,, | Performed by: NEUROLOGICAL SURGERY

## 2020-06-04 PROCEDURE — 3080F DIAST BP >= 90 MM HG: CPT | Mod: CPTII,S$GLB,, | Performed by: NEUROLOGICAL SURGERY

## 2020-06-04 NOTE — LETTER
June 4, 2020      Ita Garcia MD  [address]  [facility] [address]           [place] - Neurosurgery 7th Fl  [address]  [facility] [address]  Phone: [phone]  Fax: [phone]          Patient: Jasper Sarabia   MR Number: [id number]   YOB: 1970   Date of Visit: 6/4/2020       Dear Dr. Ita Garcia:    Thank you for referring Jasper Sarabia to me for evaluation. Attached you will find relevant portions of my assessment and plan of care.    If you have questions, please do not hesitate to call me. I look forward to following Jasper Sarabia along with you.    Sincerely,    Oj Patton MD    Enclosure  CC:  No Recipients    If you would like to receive this communication electronically, please contact [email or web address] or [phone] to request more information on [employer] access.    For providers and/or their staff who would like to refer a patient to [facility], please contact us through our one-stop-shop provider referral line, [facility] [place], at [phone].    If you feel you have received this communication in error or would no longer like to receive these types of communications, please e-mail [email or web address]

## 2020-06-04 NOTE — PROGRESS NOTES
This 50-year-old man was admitted to [facility] in May, 2018 with a ruptured anterior communicating artery aneurysm.  This was treated with endovascular coil embolization on 05/25/18 and 6/14/18, and a  shunt was placed on 06/07/2018.  The patient returned for follow-up angiography a year later.  The primary aneurysm seem to be well coiled but there was a question of a small infundibulum adjacent to the primary aneurysm.  He underwent craniotomy and exploration of the anterior communicating artery area on 07/22/2019.  It was found that there was a fenestration of the right A2 artery with branches coming from this and this was wrapped with cotton pledgets.  The patient has done well neurologically.  He has transferred his care from [facility] to [facility] and was seen this morning to establish neurosurgical followup care here.  The patient feels generally fatigued more than he has in the past.  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  Headaches are not a primary problem.  He does find if he turns suddenly that he has a feeling of pressure in his head and some imbalance.  Eye sight and hearing have remained stable.  He has had no speech difficulty.  There has been no weakness and numbness of the extremities.  Past medical history includes diabetes managed with Glucophage.  Review of systems is otherwise unremarkable.    On physical examination he is a well-developed, well-nourished, [place]- [place] man who is alert and cooperative.  Examination of the head shows a well-healed incision.  The shunt reservoir is in the right frontal area.  This pumps and refills easily.  Eyes show full extraocular movements, the pupils are equal reactive, fundi show clear disc margins.  Hearing is intact to finger rubbing bilaterally.  The neck is supple.  On neurological examination he is speaking clearly.  Provides a reasonable history.  Finger-to-nose is done well and gait is unremarkable.  Cranial  nerves are otherwise intact with normal facial sensation and movement.  Strength in the extremities is good, sensation intact, and deep tendon reflexes symmetrical.    MRI of the brain was done at [facility] on 10/31/2019.  Ventricular size is normal.  The coils are noted in the anterior communicating artery aneurysm.  There is some FLAIR intensity and contrast enhancement around this.  There are no new complicating factors.    Impression anterior communicating artery aneurysm with subarachnoid hemorrhage.  Status post endovascular coiling and craniotomy for wrapping.     Recommendation:  He seems to have recovered well from the subarachnoid hemorrhage and various procedures.  I will have CT of the head repeated in about 1 year to check  shunt function.

## 2020-06-30 ENCOUNTER — HOSPITAL ENCOUNTER (OUTPATIENT)
Dept: RADIOLOGY | Facility: HOSPITAL | Age: 50
Discharge: HOME | End: 2020-06-30
Attending: NEUROLOGICAL SURGERY
Payer: COMMERCIAL

## 2020-06-30 DIAGNOSIS — I60.8 SUBARACHNOID HEMORRHAGE DUE TO CEREBRAL ANEURYSM: ICD-10-CM

## 2020-06-30 PROCEDURE — 70450 CT HEAD/BRAIN W/O DYE: CPT | Mod: 26,,, | Performed by: RADIOLOGY

## 2020-06-30 PROCEDURE — 70450 CT HEAD WITHOUT CONTRAST: ICD-10-PCS | Mod: 26,,, | Performed by: RADIOLOGY

## 2020-06-30 PROCEDURE — 70450 CT HEAD/BRAIN W/O DYE: CPT | Mod: TC

## 2020-11-20 DIAGNOSIS — I10 ESSENTIAL HYPERTENSION: ICD-10-CM

## 2020-11-20 NOTE — TELEPHONE ENCOUNTER
----- Message from Bianca Barraza sent at 11/20/2020  8:18 AM CST -----  Contact: [phone] (Phone  Requesting an RX refill or new RX.  Is this a refill or new RX: Refill 1   RX name and strength:losartan (COZAAR) 50 MG tablet  Is this a 30 day or 90 day RX:   Pharmacy name and phone # (copy/paste from chart):  [facility] [address] [place] [address] [phone] (Phone)  [phone] (Fax)  Comments:

## 2020-11-22 RX ORDER — LOSARTAN POTASSIUM 50 MG/1
25 TABLET ORAL DAILY
Qty: 45 TABLET | Refills: 3 | Status: SHIPPED | OUTPATIENT
Start: 2020-11-22 | End: 2023-11-16

## 2021-04-13 ENCOUNTER — PATIENT MESSAGE (OUTPATIENT)
Dept: INTERNAL MEDICINE | Facility: CLINIC | Age: 51
End: 2021-04-13

## 2021-04-13 DIAGNOSIS — E11.9 DIABETES MELLITUS WITHOUT COMPLICATION: ICD-10-CM

## 2021-04-14 RX ORDER — METFORMIN HYDROCHLORIDE 500 MG/1
500 TABLET, EXTENDED RELEASE ORAL
Qty: 90 TABLET | Refills: 3 | Status: SHIPPED | OUTPATIENT
Start: 2021-04-14 | End: 2023-11-16

## 2021-04-16 ENCOUNTER — PATIENT MESSAGE (OUTPATIENT)
Dept: RESEARCH | Facility: HOSPITAL | Age: 51
End: 2021-04-16

## 2023-07-28 NOTE — PROGRESS NOTES
"Subjective:       Patient ID: Jasper Sarabia is a 47 y.o. male.    Chief Complaint: Follow-up (4 wk)    HPI    47 y.o. male presents for follow up of chronic medical problems including HTN, tobacco use    HTN - Patient is currently on amlodipine 10mg daily (takes sometimes). He does check his BP at home (sometimes), and it runs [id number] SBP (on medicaiton) and 130s-150s SBP (off medication). Side effects of medications note: none. He denies headaches, blurred vision, dizziness, chest pain, shortness of breath, nausea.    Tobacco use - he decreased from 1PPD to 1/2 PPD since last appt.   Review of Systems   Constitutional: Negative for activity change and unexpected weight change.   Eyes: Negative for visual disturbance.   Respiratory: Negative for chest tightness and shortness of breath.    Cardiovascular: Negative for chest pain and leg swelling.   Gastrointestinal: Negative for abdominal pain and nausea.   Genitourinary: Negative for difficulty urinating.   Musculoskeletal: Negative for arthralgias and myalgias.   Skin: Negative for color change.   Allergic/Immunologic: Negative for immunocompromised state.   Neurological: Negative for weakness, numbness and headaches.   Hematological: Does not bruise/bleed easily.       Objective:        Vitals:    01/05/18 1013   BP: (!) 144/88   BP Location: Right arm   Patient Position: Sitting   BP Method: Medium (Automatic)   Pulse: 77   Resp: 16   Temp: 97.9 °F (36.6 °C)   TempSrc: Oral   Weight: 98.2 kg (216 lb 7.9 oz)   Height: 5' 6" (1.676 m)       Body mass index is 34.94 kg/m².    Physical Exam   Constitutional: He is oriented to person, place, and time. He appears well-developed and well-nourished. No distress.   HENT:   Head: Normocephalic and atraumatic.   Nose: Nose normal.   Eyes: Conjunctivae and EOM are normal. Right eye exhibits no discharge. Left eye exhibits no discharge.   Neck: Normal range of motion. Neck supple.   Cardiovascular: Normal rate, regular rhythm, " Molecular Tumor Board Review Summary    Patient: Yash Cervantes  [facility] Date: 7/28/2023  Type of Discussion: Full discussion  Status Decline: No  Treatment recommended: Yes   Somatic testing recommended: No  NGS reported therapy recommended: Yes    Testing Reviewed:     Large panel: [employer] CDx  Specimen: Pancreas 7/2023  Cancer type: Pancreas    Immunotherapy markers:   MSI by NGS: Stable  MMR by IHC: Intact  PDL-1: QNS  TMB: 1 muts/mb    Additional alterations:       Treatment Recommendations:   Genomic alteration: ERBB2 amplification - equivocal  Therapy target: HER2  Medication recommendation: INV - Pertuzumab + Trastuzumab (TAPUR)  Level of Evidence: C2  Medication discussion Summary: Patient may be a candidate for an arm of the TAPUR trial based on ERBB2 amplification - equivocal finding. More details to come in note from research coordinator. [facility] would recommend pursuing the clinical trial following standard of care therapy.    Positive germline results may affect cancer risk and medical management for the patient and at-risk family members. Referral to a genetic counselor is recommended.    Our research coordinator will review these results independently and communicate any trial options that may exist to your site research team. A separate communication will be sent if the patient requires referral to Genetic Counseling based on these results    The above summary reflects our recommendations for Yash's treatment in the context of their current status and our knowledge at this time. These recommendations may change for subsequent lines of therapy or if new data emerges. If you would like this case re-reviewed in the future please let our team know.     Thank you for allowing the members of our team to participate in the care of your patient.     Amrit Bond RPH  on behalf of the [facility]     Levels of Evidence adapted from the following: OncoKB Therapeutic Levels of  Evidence V2 https://www.oncokb.org/    Level A (FDA-recognized biomarker predictive of response to an FDA-approved drug in this indication)   Level B (Standard care biomarker recommended by NCCN or other professional guidelines predictive of response to an FDA-approved drug in this indication)   Level C1 (Compelling clinical evidence supports the biomarker as being predictive of response to a drug in this indication)   Level C2 (Standard care or investigational biomarker predictive of response to an FDA-approved or investigational drug in another indication)   Level D (Compelling biological evidence supports the biomarker as being predictive of response to a drug)   Level R1 (Standard care biomarker predictive of resistance to an FDA-approved drug in this indication)   Level R2 (Compelling clinical evidence supports the biomarker as being predictive of resistance to a drug)        normal heart sounds and intact distal pulses.    Pulmonary/Chest: Effort normal. No tachypnea. No respiratory distress. He has wheezes (few scattered).   Abdominal: Soft. Bowel sounds are normal.   Musculoskeletal: Normal range of motion. He exhibits no edema.   Neurological: He is alert and oriented to person, place, and time.   Skin: Skin is warm and dry. He is not diaphoretic. No erythema.   Psychiatric: He has a normal mood and affect. His behavior is normal. Thought content normal.       Assessment:     1. Essential hypertension    2. Wheezing           Plan:         1. Essential hypertension  - BP log given. Pt will let me know if persistently >130/80. RTC 2 mo with BP log.  - information about HTN and [facility]'s digital monitoring program provided w/ AVS- encouraged him to sign up for my [facility] or at least purchase cuff since he reports wide variability in his cuff and small size.  - strongly encouraged tobacco cessation, starting exercise regimen, low salt diet, weight loss, decreasing alcohol  - amLODIPine (NORVASC) 10 MG tablet; Take 1 tablet (10 mg total) by mouth once daily.  Dispense: 90 tablet; Refill: 3      2. Wheezing  - he missed appt for PFTs and is not interested right now. Will defer w/u given improved lung exam, decreased tobacco use, and asymptomatic state.

## 2023-11-16 ENCOUNTER — OFFICE VISIT (OUTPATIENT)
Dept: PRIMARY CARE CLINIC | Facility: CLINIC | Age: 53
End: 2023-11-16
Payer: COMMERCIAL

## 2023-11-16 VITALS
TEMPERATURE: 98 F | BODY MASS INDEX: 36 KG/M2 | OXYGEN SATURATION: 97 % | HEART RATE: 71 BPM | HEIGHT: 64 IN | DIASTOLIC BLOOD PRESSURE: 90 MMHG | RESPIRATION RATE: 18 BRPM | SYSTOLIC BLOOD PRESSURE: 166 MMHG | WEIGHT: 210.88 LBS

## 2023-11-16 DIAGNOSIS — Z86.73 HISTORY OF STROKE: ICD-10-CM

## 2023-11-16 DIAGNOSIS — Z11.4 ENCOUNTER FOR SCREENING FOR HIV: ICD-10-CM

## 2023-11-16 DIAGNOSIS — Z51.81 MEDICATION MONITORING ENCOUNTER: ICD-10-CM

## 2023-11-16 DIAGNOSIS — E11.69 TYPE 2 DIABETES MELLITUS WITH HYPERLIPIDEMIA: ICD-10-CM

## 2023-11-16 DIAGNOSIS — Z11.59 NEED FOR HEPATITIS C SCREENING TEST: ICD-10-CM

## 2023-11-16 DIAGNOSIS — Z76.89 ENCOUNTER TO ESTABLISH CARE: Primary | ICD-10-CM

## 2023-11-16 DIAGNOSIS — E78.5 TYPE 2 DIABETES MELLITUS WITH HYPERLIPIDEMIA: ICD-10-CM

## 2023-11-16 DIAGNOSIS — Z00.00 ANNUAL PHYSICAL EXAM: ICD-10-CM

## 2023-11-16 DIAGNOSIS — Z12.11 COLON CANCER SCREENING: ICD-10-CM

## 2023-11-16 DIAGNOSIS — I10 BENIGN ESSENTIAL HTN: ICD-10-CM

## 2023-11-16 PROCEDURE — 1159F PR MEDICATION LIST DOCUMENTED IN MEDICAL RECORD: ICD-10-PCS | Mod: CPTII,S$GLB,, | Performed by: STUDENT IN AN ORGANIZED HEALTH CARE EDUCATION/TRAINING PROGRAM

## 2023-11-16 PROCEDURE — 3080F DIAST BP >= 90 MM HG: CPT | Mod: CPTII,S$GLB,, | Performed by: STUDENT IN AN ORGANIZED HEALTH CARE EDUCATION/TRAINING PROGRAM

## 2023-11-16 PROCEDURE — 3077F SYST BP >= 140 MM HG: CPT | Mod: CPTII,S$GLB,, | Performed by: STUDENT IN AN ORGANIZED HEALTH CARE EDUCATION/TRAINING PROGRAM

## 2023-11-16 PROCEDURE — 99999 PR PBB SHADOW E&M-EST. PATIENT-LVL IV: ICD-10-PCS | Mod: PBBFAC,,, | Performed by: STUDENT IN AN ORGANIZED HEALTH CARE EDUCATION/TRAINING PROGRAM

## 2023-11-16 PROCEDURE — 3077F PR MOST RECENT SYSTOLIC BLOOD PRESSURE >= 140 MM HG: ICD-10-PCS | Mod: CPTII,S$GLB,, | Performed by: STUDENT IN AN ORGANIZED HEALTH CARE EDUCATION/TRAINING PROGRAM

## 2023-11-16 PROCEDURE — 99386 PREV VISIT NEW AGE 40-64: CPT | Mod: S$GLB,,, | Performed by: STUDENT IN AN ORGANIZED HEALTH CARE EDUCATION/TRAINING PROGRAM

## 2023-11-16 PROCEDURE — 99386 PR PREVENTIVE VISIT,NEW,40-64: ICD-10-PCS | Mod: S$GLB,,, | Performed by: STUDENT IN AN ORGANIZED HEALTH CARE EDUCATION/TRAINING PROGRAM

## 2023-11-16 PROCEDURE — 99999 PR PBB SHADOW E&M-EST. PATIENT-LVL IV: CPT | Mod: PBBFAC,,, | Performed by: STUDENT IN AN ORGANIZED HEALTH CARE EDUCATION/TRAINING PROGRAM

## 2023-11-16 PROCEDURE — 3008F BODY MASS INDEX DOCD: CPT | Mod: CPTII,S$GLB,, | Performed by: STUDENT IN AN ORGANIZED HEALTH CARE EDUCATION/TRAINING PROGRAM

## 2023-11-16 PROCEDURE — 3080F PR MOST RECENT DIASTOLIC BLOOD PRESSURE >= 90 MM HG: ICD-10-PCS | Mod: CPTII,S$GLB,, | Performed by: STUDENT IN AN ORGANIZED HEALTH CARE EDUCATION/TRAINING PROGRAM

## 2023-11-16 PROCEDURE — 1160F PR REVIEW ALL MEDS BY PRESCRIBER/CLIN PHARMACIST DOCUMENTED: ICD-10-PCS | Mod: CPTII,S$GLB,, | Performed by: STUDENT IN AN ORGANIZED HEALTH CARE EDUCATION/TRAINING PROGRAM

## 2023-11-16 PROCEDURE — 3008F PR BODY MASS INDEX (BMI) DOCUMENTED: ICD-10-PCS | Mod: CPTII,S$GLB,, | Performed by: STUDENT IN AN ORGANIZED HEALTH CARE EDUCATION/TRAINING PROGRAM

## 2023-11-16 PROCEDURE — 1159F MED LIST DOCD IN RCRD: CPT | Mod: CPTII,S$GLB,, | Performed by: STUDENT IN AN ORGANIZED HEALTH CARE EDUCATION/TRAINING PROGRAM

## 2023-11-16 PROCEDURE — 1160F RVW MEDS BY RX/DR IN RCRD: CPT | Mod: CPTII,S$GLB,, | Performed by: STUDENT IN AN ORGANIZED HEALTH CARE EDUCATION/TRAINING PROGRAM

## 2023-11-16 RX ORDER — LISINOPRIL 10 MG/1
10 TABLET ORAL DAILY
Qty: 90 TABLET | Refills: 3 | Status: SHIPPED | OUTPATIENT
Start: 2023-11-16 | End: 2023-11-30 | Stop reason: SDUPTHER

## 2023-11-16 RX ORDER — METFORMIN HYDROCHLORIDE 1000 MG/1
1000 TABLET ORAL 2 TIMES DAILY WITH MEALS
COMMUNITY

## 2023-11-16 RX ORDER — LANCETS
EACH MISCELLANEOUS
Qty: 100 EACH | Refills: 3 | Status: SHIPPED | OUTPATIENT
Start: 2023-11-16

## 2023-11-16 RX ORDER — ROSUVASTATIN CALCIUM 20 MG/1
20 TABLET, COATED ORAL DAILY
Qty: 90 TABLET | Refills: 3 | Status: SHIPPED | OUTPATIENT
Start: 2023-11-16 | End: 2023-12-12 | Stop reason: SDUPTHER

## 2023-11-16 RX ORDER — INSULIN PUMP SYRINGE, 3 ML
EACH MISCELLANEOUS
Qty: 1 EACH | Refills: 0 | Status: SHIPPED | OUTPATIENT
Start: 2023-11-16 | End: 2024-11-15

## 2023-11-16 NOTE — PROGRESS NOTES
"Subjective:       Patient ID: Jasper Sarabia is a 53 y.o. male.    Chief Complaint: No chief complaint on file.      HPI:  53 y.o. male presents to [facility] to establish care    Acute concerns?: None today's visit    Last PCP?:   Allergies: NKDA  Medical History: HTN, Hemorrhagic stroke, brain aneurysm, T2DM  Medications: metformin 1000 mg bid  Surgical History: Brain surgery, aneurysm repair, open shunt placement to brain, lumbar back surgery  Family History: Sister bladder cancer; no known autoimmune disease  Social History: Quit smoking > 5 years ago, EtOH 3 glasses wine/week, no illicits    Denies family history of thyroid cancer or Multiple Endocrine Neoplasia  No personal history of pancreatitis    Fasting?: No  HIV/Hep C screening?: Amenable  Colonoscopy?: Amenable    Review of Systems   Constitutional:  Negative for chills, diaphoresis, fatigue and fever.   HENT:  Negative for congestion, sinus pressure, sneezing and sore throat.    Respiratory:  Negative for cough and shortness of breath.    Cardiovascular:  Negative for chest pain and palpitations.   Gastrointestinal:  Negative for abdominal pain, diarrhea, nausea and vomiting.   Musculoskeletal:  Negative for arthralgias, joint swelling and myalgias.   Skin:  Negative for rash and wound.   Neurological:  Negative for dizziness, light-headedness and headaches.       Objective:      Vitals:    11/16/23 1347   BP: (!) 170/90   BP Location: Right arm   Patient Position: Sitting   BP Method: Medium (Manual)   Pulse: 71   Resp: 18   Temp: 97.6 °F (36.4 °C)   TempSrc: Temporal   SpO2: 97%   Weight: 95.7 kg (210 lb 13.9 oz)   Height: 5' 4" (1.626 m)     Physical Exam  Vitals reviewed.   Constitutional:       General: He is not in acute distress.     Appearance: Normal appearance. He is not ill-appearing.   HENT:      Head: Normocephalic and atraumatic.   Eyes:      General:         Right eye: No discharge.         Left eye: No discharge.      Conjunctiva/sclera: " "Conjunctivae normal.   Cardiovascular:      Rate and Rhythm: Normal rate and regular rhythm.      Heart sounds: Normal heart sounds. No murmur heard.  Pulmonary:      Effort: Pulmonary effort is normal.      Breath sounds: Normal breath sounds.   Musculoskeletal:         General: No deformity.      Cervical back: Neck supple. No rigidity.   Lymphadenopathy:      Cervical: No cervical adenopathy.   Skin:     General: Skin is warm and dry.      Coloration: Skin is not jaundiced.   Neurological:      General: No focal deficit present.      Mental Status: He is alert and oriented to person, place, and time.   Psychiatric:         Mood and Affect: Mood normal.         Behavior: Behavior normal.             Lab Results   Component Value Date     12/02/2019    K 4.0 12/02/2019     12/02/2019    CO2 23 12/02/2019    BUN 14 12/02/2019    CREATININE 0.8 12/02/2019    ANIONGAP 9 12/02/2019     Lab Results   Component Value Date    HGBA1C 8.6 (H) 12/02/2019     No results found for: "BNP", "BNPTRIAGEBLO"    No results found for: "WBC", "HGB", "HCT", "PLT", "GRAN"  Lab Results   Component Value Date    CHOL 217 (H) 12/02/2019    HDL 40 12/02/2019    LDLCALC 115.0 12/02/2019    TRIG 310 (H) 12/02/2019          Current Outpatient Medications:     metFORMIN (GLUCOPHAGE) 1000 MG tablet, Take 1,000 mg by mouth 2 (two) times daily with meals., Disp: , Rfl:     blood sugar diagnostic Str, To check BG once daily when fasting, to use with insurance preferred meter, Disp: 100 each, Rfl: 3    blood-glucose meter kit, To check BG once daily when fasting, to use with insurance preferred meter, Disp: 1 each, Rfl: 0    lancets Misc, To check BG once daily when fasting, to use with insurance preferred meter, Disp: 100 each, Rfl: 3    lisinopriL 10 MG tablet, Take 1 tablet (10 mg total) by mouth once daily., Disp: 90 tablet, Rfl: 3    rosuvastatin (CRESTOR) 20 MG tablet, Take 1 tablet (20 mg total) by mouth once daily., Disp: 90 " tablet, Rfl: 3        Assessment:       1. Encounter to establish care    2. Annual physical exam    3. Type 2 diabetes mellitus with hyperlipidemia    4. Benign essential HTN    5. Medication monitoring encounter    6. History of stroke    7. Need for hepatitis C screening test    8. Encounter for screening for HIV    9. Colon cancer screening           Plan:       Encounter to establish care  Annual physical exam  Type 2 diabetes mellitus with hyperlipidemia  -     Lipid Panel; Future; Expected date: 11/16/2023  -     rosuvastatin (CRESTOR) 20 MG tablet; Take 1 tablet (20 mg total) by mouth once daily.  Dispense: 90 tablet; Refill: 3  -     Hemoglobin A1C; Future; Expected date: 11/16/2023  -     lisinopriL 10 MG tablet; Take 1 tablet (10 mg total) by mouth once daily.  Dispense: 90 tablet; Refill: 3  -     blood-glucose meter kit; To check BG once daily when fasting, to use with insurance preferred meter  Dispense: 1 each; Refill: 0  -     lancets Misc; To check BG once daily when fasting, to use with insurance preferred meter  Dispense: 100 each; Refill: 3  -     blood sugar diagnostic Strp; To check BG once daily when fasting, to use with insurance preferred meter  Dispense: 100 each; Refill: 3  - Dietary guidance provided today  - Can add GL1-RA if A1c remains significantly elevated on metformin    Benign essential HTN  -     lisinopriL 10 MG tablet; Take 1 tablet (10 mg total) by mouth once daily.  Dispense: 90 tablet; Refill: 3    Medication monitoring encounter  -     CBC Auto Differential; Future; Expected date: 11/16/2023  -     Comprehensive Metabolic Panel; Future; Expected date: 11/16/2023    History of stroke  -     rosuvastatin (CRESTOR) 20 MG tablet; Take 1 tablet (20 mg total) by mouth once daily.  Dispense: 90 tablet; Refill: 3    Need for hepatitis C screening test  -     Hepatitis C Antibody; Future; Expected date: 11/16/2023    Encounter for screening for HIV  -     HIV 1/2 Ag/Ab (4th Gen);  Future; Expected date: 11/16/2023    Colon cancer screening  -     Case Request Endoscopy: COLONOSCOPY    RTC in 3 months

## 2023-11-30 ENCOUNTER — CLINICAL SUPPORT (OUTPATIENT)
Dept: PRIMARY CARE CLINIC | Facility: CLINIC | Age: 53
End: 2023-11-30
Payer: COMMERCIAL

## 2023-11-30 VITALS — OXYGEN SATURATION: 97 % | SYSTOLIC BLOOD PRESSURE: 150 MMHG | HEART RATE: 70 BPM | DIASTOLIC BLOOD PRESSURE: 90 MMHG

## 2023-11-30 DIAGNOSIS — E78.5 TYPE 2 DIABETES MELLITUS WITH HYPERLIPIDEMIA: ICD-10-CM

## 2023-11-30 DIAGNOSIS — E11.69 TYPE 2 DIABETES MELLITUS WITH HYPERLIPIDEMIA: ICD-10-CM

## 2023-11-30 DIAGNOSIS — I10 ESSENTIAL HYPERTENSION: Primary | ICD-10-CM

## 2023-11-30 DIAGNOSIS — I10 BENIGN ESSENTIAL HTN: ICD-10-CM

## 2023-11-30 PROCEDURE — 99999 PR PBB SHADOW E&M-EST. PATIENT-LVL II: ICD-10-PCS | Mod: PBBFAC,,,

## 2023-11-30 PROCEDURE — 99999 PR PBB SHADOW E&M-EST. PATIENT-LVL II: CPT | Mod: PBBFAC,,,

## 2023-11-30 RX ORDER — LISINOPRIL 10 MG/1
10 TABLET ORAL DAILY
Qty: 90 TABLET | Refills: 3 | Status: SHIPPED | OUTPATIENT
Start: 2023-11-30 | End: 2024-11-29

## 2023-11-30 RX ORDER — LISINOPRIL 20 MG/1
20 TABLET ORAL DAILY
Qty: 90 TABLET | Refills: 3 | Status: SHIPPED | OUTPATIENT
Start: 2023-11-30 | End: 2023-11-30 | Stop reason: SDUPTHER

## 2023-11-30 NOTE — PROGRESS NOTES
Verified pt ID using name and . Obtained bp: 150/90, p, and sp02. Notified physician of results. Instructed pt to start taking bp medication. Pt verbalized understanding

## 2023-12-11 ENCOUNTER — PATIENT MESSAGE (OUTPATIENT)
Dept: ADMINISTRATIVE | Facility: HOSPITAL | Age: 53
End: 2023-12-11
Payer: COMMERCIAL

## 2023-12-12 ENCOUNTER — TELEPHONE (OUTPATIENT)
Dept: PRIMARY CARE CLINIC | Facility: CLINIC | Age: 53
End: 2023-12-12
Payer: COMMERCIAL

## 2023-12-12 ENCOUNTER — PATIENT OUTREACH (OUTPATIENT)
Dept: ADMINISTRATIVE | Facility: HOSPITAL | Age: 53
End: 2023-12-12
Payer: COMMERCIAL

## 2023-12-12 DIAGNOSIS — E78.5 TYPE 2 DIABETES MELLITUS WITH HYPERLIPIDEMIA: ICD-10-CM

## 2023-12-12 DIAGNOSIS — E11.69 TYPE 2 DIABETES MELLITUS WITH HYPERLIPIDEMIA: ICD-10-CM

## 2023-12-12 DIAGNOSIS — Z12.11 SCREENING FOR COLON CANCER: Primary | ICD-10-CM

## 2023-12-12 DIAGNOSIS — Z86.73 HISTORY OF STROKE: ICD-10-CM

## 2023-12-12 RX ORDER — ROSUVASTATIN CALCIUM 20 MG/1
20 TABLET, COATED ORAL DAILY
Qty: 90 TABLET | Refills: 3 | Status: SHIPPED | OUTPATIENT
Start: 2023-12-12 | End: 2024-12-11

## 2023-12-12 NOTE — TELEPHONE ENCOUNTER
----- Message from Arvind Reynolds MA sent at 12/11/2023  5:23 PM CST -----  Contact: [phone] Patient    ----- Message -----  From: Veena Joiner  Sent: 12/11/2023  10:33 AM CST  To: [facility] KRAFT Staff    PHARMACY CHANGE    Please SEND TO [facility] NOT [facility]. Thank you    Requesting an RX refill or new RX.  Is this a refill or new RX: new  RX name and strength (copy/paste from chart):  rosuvastatin (CRESTOR) 20 MG tablet  Is this a 30 day or 90 day RX:   Pharmacy name and phone # (copy/paste from chart):    [facility]/pharmacy #[id number] MARJ Mayorga - [address]  [address]  [place] [address]  Phone: [phone] Fax: [phone]    Requesting an RX refill or new RX.  Is this a refill or new RX: new  RX name and strength (copy/paste from chart):  semaglutide (OZEMPIC) 0.25 mg or 0.5 mg (2 mg/3 mL) pen injector  Is this a 30 day or 90 day RX:   Pharmacy name and phone # (copy/paste from chart):    [facility]/pharmacy #[id number] MARJ Mayorga - [address]  [address]  [place] [address]  Phone: [phone] Fax: [phone]

## 2023-12-12 NOTE — PROGRESS NOTES
Health Maintenance Due   Topic Date Due    COVID-19 Vaccine (1) Never done    Colorectal Cancer Screening  Never done     Immunizations - reviewed and updated   Care Everywhere - triggered   Care Teams - updated   Outreach - Spoke with patient in regards to overdue colon cancer screening, order placed for colonoscopy screening per patient request. Staff message sent to Colorectal Department to contact patient in regards to scheduling.  Patient states understanding.

## 2023-12-18 ENCOUNTER — PATIENT MESSAGE (OUTPATIENT)
Dept: PRIMARY CARE CLINIC | Facility: CLINIC | Age: 53
End: 2023-12-18
Payer: COMMERCIAL

## 2024-01-12 ENCOUNTER — PATIENT MESSAGE (OUTPATIENT)
Dept: ADMINISTRATIVE | Facility: HOSPITAL | Age: 54
End: 2024-01-12
Payer: COMMERCIAL

## 2024-02-01 ENCOUNTER — PATIENT OUTREACH (OUTPATIENT)
Dept: ADMINISTRATIVE | Facility: HOSPITAL | Age: 54
End: 2024-02-01
Payer: COMMERCIAL

## 2024-02-01 NOTE — PROGRESS NOTES
Health Maintenance Due   Topic Date Due    COVID-19 Vaccine (1) Never done    Colorectal Cancer Screening  Never done        Chart review done.   HM updated.   Immunizations reviewed & updated.   Care Everywhere updated.

## 2024-02-15 ENCOUNTER — OFFICE VISIT (OUTPATIENT)
Dept: PRIMARY CARE CLINIC | Facility: CLINIC | Age: 54
End: 2024-02-15
Payer: COMMERCIAL

## 2024-02-15 VITALS
SYSTOLIC BLOOD PRESSURE: 171 MMHG | HEIGHT: 64 IN | TEMPERATURE: 98 F | WEIGHT: 216.38 LBS | BODY MASS INDEX: 36.94 KG/M2 | DIASTOLIC BLOOD PRESSURE: 94 MMHG | RESPIRATION RATE: 18 BRPM | OXYGEN SATURATION: 97 % | HEART RATE: 65 BPM

## 2024-02-15 DIAGNOSIS — E78.5 TYPE 2 DIABETES MELLITUS WITH HYPERLIPIDEMIA: ICD-10-CM

## 2024-02-15 DIAGNOSIS — E11.69 TYPE 2 DIABETES MELLITUS WITH HYPERLIPIDEMIA: ICD-10-CM

## 2024-02-15 DIAGNOSIS — I10 BENIGN ESSENTIAL HTN: Primary | ICD-10-CM

## 2024-02-15 PROCEDURE — 3077F SYST BP >= 140 MM HG: CPT | Mod: CPTII,S$GLB,, | Performed by: STUDENT IN AN ORGANIZED HEALTH CARE EDUCATION/TRAINING PROGRAM

## 2024-02-15 PROCEDURE — 1159F MED LIST DOCD IN RCRD: CPT | Mod: CPTII,S$GLB,, | Performed by: STUDENT IN AN ORGANIZED HEALTH CARE EDUCATION/TRAINING PROGRAM

## 2024-02-15 PROCEDURE — 3008F BODY MASS INDEX DOCD: CPT | Mod: CPTII,S$GLB,, | Performed by: STUDENT IN AN ORGANIZED HEALTH CARE EDUCATION/TRAINING PROGRAM

## 2024-02-15 PROCEDURE — 99999 PR PBB SHADOW E&M-EST. PATIENT-LVL IV: CPT | Mod: PBBFAC,,, | Performed by: STUDENT IN AN ORGANIZED HEALTH CARE EDUCATION/TRAINING PROGRAM

## 2024-02-15 PROCEDURE — 99214 OFFICE O/P EST MOD 30 MIN: CPT | Mod: S$GLB,,, | Performed by: STUDENT IN AN ORGANIZED HEALTH CARE EDUCATION/TRAINING PROGRAM

## 2024-02-15 PROCEDURE — 3079F DIAST BP 80-89 MM HG: CPT | Mod: CPTII,S$GLB,, | Performed by: STUDENT IN AN ORGANIZED HEALTH CARE EDUCATION/TRAINING PROGRAM

## 2024-02-15 PROCEDURE — 1160F RVW MEDS BY RX/DR IN RCRD: CPT | Mod: CPTII,S$GLB,, | Performed by: STUDENT IN AN ORGANIZED HEALTH CARE EDUCATION/TRAINING PROGRAM

## 2024-02-15 RX ORDER — FLASH GLUCOSE SENSOR
1 KIT MISCELLANEOUS
Qty: 1 KIT | Refills: 11 | Status: SHIPPED | OUTPATIENT
Start: 2024-02-15 | End: 2024-04-22 | Stop reason: SDUPTHER

## 2024-02-15 RX ORDER — HYDROCHLOROTHIAZIDE 12.5 MG/1
12.5 TABLET ORAL DAILY
Qty: 30 TABLET | Refills: 11 | Status: SHIPPED | OUTPATIENT
Start: 2024-02-15 | End: 2024-04-22 | Stop reason: SDUPTHER

## 2024-02-15 RX ORDER — DULAGLUTIDE 0.75 MG/.5ML
0.75 INJECTION, SOLUTION SUBCUTANEOUS
Qty: 4 PEN | Refills: 11 | Status: SHIPPED | OUTPATIENT
Start: 2024-02-15 | End: 2024-04-22

## 2024-02-15 RX ORDER — FLASH GLUCOSE SCANNING READER
EACH MISCELLANEOUS
Status: CANCELLED | OUTPATIENT
Start: 2024-02-15

## 2024-02-15 NOTE — PROGRESS NOTES
"Subjective:       Patient ID: Jasper Sarabia is a 54 y.o. male.    Chief Complaint: Follow-up (3 month follow up/reg check up)    HPI:  54 y.o. male presents to [facility] for 3 month follow-up    Acute concerns?: None today, here for scheduled visit    No family history of thyroid cancer or Multiple Endocrine Neoplasia  No personal Hx of pancreatitis    Most recent HbA1c: 9.0% 11/29/2023  Home Reads?: Not logging  Current Meds: metformin 1000 mg bid  On Insulin?: No  Compliance: missed 2-4 times past 6 months      HTN:  Home BP log?:Not checking at home  Medications: lisinopril 10 mg  Compliance?: Never misses, didn't take today's visit      Diet?: Stopped eating bread and curbing rice and potato, no drinks with sugar  Exercise?: Walking frequently around building. Plays soccer on weekend      Colonoscopy?: Is scheduling currently with [facility]    Review of Systems   Constitutional:  Negative for chills, diaphoresis, fatigue and fever.   HENT:  Negative for congestion, sinus pressure, sneezing and sore throat.    Respiratory:  Negative for cough and shortness of breath.    Cardiovascular:  Negative for chest pain and palpitations.   Gastrointestinal:  Negative for abdominal pain, diarrhea, nausea and vomiting.   Musculoskeletal:  Negative for joint swelling and myalgias.   Skin:  Negative for rash and wound.   Neurological:  Negative for dizziness, light-headedness and headaches.       Objective:      Vitals:    02/15/24 0839   BP: (!) 178/82   BP Location: Right arm   Patient Position: Sitting   BP Method: Medium (Manual)   Pulse: 65   Resp: 18   Temp: 97.7 °F (36.5 °C)   TempSrc: Temporal   SpO2: 97%   Weight: 98.1 kg (216 lb 6.1 oz)   Height: 5' 4" (1.626 m)     Physical Exam  Vitals reviewed.   Constitutional:       General: He is not in acute distress.     Appearance: Normal appearance. He is not ill-appearing.   HENT:      Head: Normocephalic and atraumatic.      Mouth/Throat:      Mouth: Mucous membranes are " "moist.      Pharynx: Oropharynx is clear.   Eyes:      General:         Right eye: No discharge.         Left eye: No discharge.   Cardiovascular:      Rate and Rhythm: Normal rate and regular rhythm.      Pulses: Normal pulses.      Heart sounds: No murmur heard.  Pulmonary:      Effort: Pulmonary effort is normal.      Breath sounds: Normal breath sounds.   Abdominal:      Palpations: Abdomen is soft.      Tenderness: There is no abdominal tenderness.   Musculoskeletal:         General: No deformity.   Skin:     General: Skin is warm and dry.      Coloration: Skin is not jaundiced.   Neurological:      General: No focal deficit present.      Mental Status: He is alert and oriented to person, place, and time.   Psychiatric:         Mood and Affect: Mood normal.         Behavior: Behavior normal.             Lab Results   Component Value Date     11/29/2023    K 4.3 11/29/2023     11/29/2023    CO2 25 11/29/2023    BUN 9 11/29/2023    CREATININE 0.9 11/29/2023    ANIONGAP 10 11/29/2023     Lab Results   Component Value Date    HGBA1C 9.0 (H) 11/29/2023     No results found for: "BNP", "BNPTRIAGEBLO"    Lab Results   Component Value Date    WBC 6.85 11/29/2023    HGB 14.1 11/29/2023    HCT 41.9 11/29/2023     11/29/2023    GRAN 4.3 11/29/2023    GRAN 62.4 11/29/2023     Lab Results   Component Value Date    CHOL 211 (H) 11/29/2023    HDL 41 11/29/2023    LDLCALC 96.2 11/29/2023    TRIG 369 (H) 11/29/2023          Current Outpatient Medications:     blood sugar diagnostic Strp, To check BG once daily when fasting, to use with insurance preferred meter, Disp: 100 each, Rfl: 3    blood-glucose meter kit, To check BG once daily when fasting, to use with insurance preferred meter, Disp: 1 each, Rfl: 0    lancets Misc, To check BG once daily when fasting, to use with insurance preferred meter, Disp: 100 each, Rfl: 3    lisinopriL 10 MG tablet, Take 1 tablet (10 mg total) by mouth once daily., Disp: 90 " tablet, Rfl: 3    metFORMIN (GLUCOPHAGE) 1000 MG tablet, Take 1,000 mg by mouth 2 (two) times daily with meals., Disp: , Rfl:     rosuvastatin (CRESTOR) 20 MG tablet, Take 1 tablet (20 mg total) by mouth once daily., Disp: 90 tablet, Rfl: 3    dulaglutide (TRULICITY) 0.75 mg/0.5 mL pen injector, Inject 0.75 mg into the skin every 7 days., Disp: 4 pen , Rfl: 11    flash glucose sensor (FREESTYLE RO 2 SENSOR) Kit, 1 Device by Misc.(Non-Drug; Combo Route) route every 14 (fourteen) days., Disp: 1 kit, Rfl: 11    hydroCHLOROthiazide (HYDRODIURIL) 12.5 MG Tab, Take 1 tablet (12.5 mg total) by mouth once daily., Disp: 30 tablet, Rfl: 11        Assessment:       1. Benign essential HTN    2. Type 2 diabetes mellitus with hyperlipidemia           Plan:       Benign essential HTN  -     hydroCHLOROthiazide (HYDRODIURIL) 12.5 MG Tab; Take 1 tablet (12.5 mg total) by mouth once daily.  Dispense: 30 tablet; Refill: 11  - Will attempt Thiazide with     Type 2 diabetes mellitus with hyperlipidemia  -     flash glucose sensor (FREESTYLE RO 2 SENSOR) Kit; 1 Device by Misc.(Non-Drug; Combo Route) route every 14 (fourteen) days.  Dispense: 1 kit; Refill: 11  -     dulaglutide (TRULICITY) 0.75 mg/0.5 mL pen injector; Inject 0.75 mg into the skin every 7 days.  Dispense: 4 pen ; Refill: 11  - Will attempt insurance approval of Trulicity. Patient aware will have to pay out of pocket for CGM  - Diet and exercise recommendations provided today's visit    RTC in 2 weeks for nurse BP check, 3 months with me

## 2024-03-12 ENCOUNTER — PATIENT MESSAGE (OUTPATIENT)
Dept: PRIMARY CARE CLINIC | Facility: CLINIC | Age: 54
End: 2024-03-12
Payer: COMMERCIAL

## 2024-03-12 DIAGNOSIS — E11.69 TYPE 2 DIABETES MELLITUS WITH HYPERLIPIDEMIA: Primary | ICD-10-CM

## 2024-03-12 DIAGNOSIS — E78.5 TYPE 2 DIABETES MELLITUS WITH HYPERLIPIDEMIA: Primary | ICD-10-CM

## 2024-04-08 ENCOUNTER — TELEPHONE (OUTPATIENT)
Dept: DIABETES | Facility: CLINIC | Age: 54
End: 2024-04-08
Payer: COMMERCIAL

## 2024-04-09 ENCOUNTER — TELEPHONE (OUTPATIENT)
Dept: GASTROENTEROLOGY | Facility: CLINIC | Age: 54
End: 2024-04-09
Payer: COMMERCIAL

## 2024-04-19 ENCOUNTER — TELEPHONE (OUTPATIENT)
Dept: DIABETES | Facility: CLINIC | Age: 54
End: 2024-04-19
Payer: COMMERCIAL

## 2024-04-22 ENCOUNTER — OFFICE VISIT (OUTPATIENT)
Dept: DIABETES | Facility: CLINIC | Age: 54
End: 2024-04-22
Payer: COMMERCIAL

## 2024-04-22 VITALS
OXYGEN SATURATION: 98 % | HEART RATE: 75 BPM | SYSTOLIC BLOOD PRESSURE: 154 MMHG | HEIGHT: 64 IN | DIASTOLIC BLOOD PRESSURE: 90 MMHG | BODY MASS INDEX: 34.66 KG/M2 | WEIGHT: 203 LBS

## 2024-04-22 DIAGNOSIS — Z86.79 HISTORY OF CEREBRAL ANEURYSM: ICD-10-CM

## 2024-04-22 DIAGNOSIS — I10 BENIGN ESSENTIAL HTN: ICD-10-CM

## 2024-04-22 DIAGNOSIS — Z91.148 NONCOMPLIANCE W/MEDICATION TREATMENT DUE TO INTERMIT USE OF MEDICATION: ICD-10-CM

## 2024-04-22 DIAGNOSIS — I10 ELEVATED BLOOD PRESSURE READING IN OFFICE WITH DIAGNOSIS OF HYPERTENSION: ICD-10-CM

## 2024-04-22 DIAGNOSIS — E78.5 HYPERLIPIDEMIA ASSOCIATED WITH TYPE 2 DIABETES MELLITUS: ICD-10-CM

## 2024-04-22 DIAGNOSIS — Z71.9 HEALTH EDUCATION/COUNSELING: ICD-10-CM

## 2024-04-22 DIAGNOSIS — E11.69 HYPERLIPIDEMIA ASSOCIATED WITH TYPE 2 DIABETES MELLITUS: ICD-10-CM

## 2024-04-22 DIAGNOSIS — E78.5 TYPE 2 DIABETES MELLITUS WITH HYPERLIPIDEMIA: ICD-10-CM

## 2024-04-22 DIAGNOSIS — E66.09 CLASS 1 OBESITY DUE TO EXCESS CALORIES WITH SERIOUS COMORBIDITY AND BODY MASS INDEX (BMI) OF 34.0 TO 34.9 IN ADULT: ICD-10-CM

## 2024-04-22 DIAGNOSIS — E11.65 TYPE 2 DIABETES MELLITUS WITH HYPERGLYCEMIA, WITHOUT LONG-TERM CURRENT USE OF INSULIN: Primary | ICD-10-CM

## 2024-04-22 DIAGNOSIS — E11.69 TYPE 2 DIABETES MELLITUS WITH HYPERLIPIDEMIA: ICD-10-CM

## 2024-04-22 DIAGNOSIS — I10 ESSENTIAL HYPERTENSION: ICD-10-CM

## 2024-04-22 PROBLEM — E66.811 CLASS 1 OBESITY DUE TO EXCESS CALORIES WITH SERIOUS COMORBIDITY AND BODY MASS INDEX (BMI) OF 34.0 TO 34.9 IN ADULT: Status: ACTIVE | Noted: 2024-04-22

## 2024-04-22 PROCEDURE — 3008F BODY MASS INDEX DOCD: CPT | Mod: CPTII,S$GLB,, | Performed by: NURSE PRACTITIONER

## 2024-04-22 PROCEDURE — 1159F MED LIST DOCD IN RCRD: CPT | Mod: CPTII,S$GLB,, | Performed by: NURSE PRACTITIONER

## 2024-04-22 PROCEDURE — 99204 OFFICE O/P NEW MOD 45 MIN: CPT | Mod: S$GLB,,, | Performed by: NURSE PRACTITIONER

## 2024-04-22 PROCEDURE — 3080F DIAST BP >= 90 MM HG: CPT | Mod: CPTII,S$GLB,, | Performed by: NURSE PRACTITIONER

## 2024-04-22 PROCEDURE — 3052F HG A1C>EQUAL 8.0%<EQUAL 9.0%: CPT | Mod: CPTII,S$GLB,, | Performed by: NURSE PRACTITIONER

## 2024-04-22 PROCEDURE — 99999 PR PBB SHADOW E&M-EST. PATIENT-LVL IV: CPT | Mod: PBBFAC,,, | Performed by: NURSE PRACTITIONER

## 2024-04-22 PROCEDURE — 3077F SYST BP >= 140 MM HG: CPT | Mod: CPTII,S$GLB,, | Performed by: NURSE PRACTITIONER

## 2024-04-22 PROCEDURE — 95251 CONT GLUC MNTR ANALYSIS I&R: CPT | Mod: S$GLB,,, | Performed by: NURSE PRACTITIONER

## 2024-04-22 PROCEDURE — 4010F ACE/ARB THERAPY RXD/TAKEN: CPT | Mod: CPTII,S$GLB,, | Performed by: NURSE PRACTITIONER

## 2024-04-22 PROCEDURE — 1160F RVW MEDS BY RX/DR IN RCRD: CPT | Mod: CPTII,S$GLB,, | Performed by: NURSE PRACTITIONER

## 2024-04-22 RX ORDER — LISINOPRIL 10 MG/1
10 TABLET ORAL DAILY
Qty: 90 TABLET | Refills: 3 | Status: SHIPPED | OUTPATIENT
Start: 2024-04-22 | End: 2024-05-07 | Stop reason: DRUGHIGH

## 2024-04-22 RX ORDER — TIRZEPATIDE 7.5 MG/.5ML
7.5 INJECTION, SOLUTION SUBCUTANEOUS
Qty: 4 PEN | Refills: 5 | Status: SHIPPED | OUTPATIENT
Start: 2024-04-22

## 2024-04-22 RX ORDER — HYDROCHLOROTHIAZIDE 12.5 MG/1
12.5 TABLET ORAL DAILY
Qty: 90 TABLET | Refills: 1 | Status: SHIPPED | OUTPATIENT
Start: 2024-04-22 | End: 2025-04-22

## 2024-04-22 RX ORDER — TIRZEPATIDE 5 MG/.5ML
5 INJECTION, SOLUTION SUBCUTANEOUS
COMMUNITY
End: 2024-04-22

## 2024-04-22 RX ORDER — FLASH GLUCOSE SENSOR
1 KIT MISCELLANEOUS
Qty: 2 KIT | Refills: 11 | Status: SHIPPED | OUTPATIENT
Start: 2024-04-22

## 2024-04-22 NOTE — ASSESSMENT & PLAN NOTE
Uncontrolled   Would like to see blood sugar readings a little bit lower.  Having some prandial excursions  Update A1c  Continue CGM   He wants to be on mounjaro-- reports it works better than Metformin   Would benefit from Jardiance as well         -- Medication Changes:   Start Jardiance 10 mg daily   -- Please let us know if you have nausea, vomiting, or weakness with a normal BG. This could be euglycemic DKA.  -- please hold medication 3 days prior to surgery or if you are sick and have decreased intake.   -- Please drink lots of water daily while on this medication.   --This medication could also give you a yeast infection- please let us know if this occurs and we can treat it.     Increase your Mounajro to 7.5 mg weekly   If you are unable to get the Mounjaro- let me know and we will send in Ozempic instead [phone]       -- Reviewed goals of therapy are to get the best control we can without hypoglycemia.  -- Advised frequent self blood glucose monitoring.  Patient encouraged to document glucose results and bring them to every clinic visit. Continue silvia 2   -- Hypoglycemia precautions discussed. Instructed on precautions before driving.    -- Call for Bg repeatedly < 70 or > 180.   -- Close adherence to lifestyle changes recommended.   -- Periodic follow ups for eye evaluations, foot care and dental care suggested.

## 2024-04-22 NOTE — ASSESSMENT & PLAN NOTE
Body mass index is 34.84 kg/m².  Increases insulin resistance.   Discussed DM diet and exercise.

## 2024-04-22 NOTE — PATIENT INSTRUCTIONS
Start Jardiance 10 mg daily   -- Please let us know if you have nausea, vomiting, or weakness with a normal BG. This could be euglycemic DKA.  -- please hold medication 3 days prior to surgery or if you are sick and have decreased intake.   -- Please drink lots of water daily while on this medication.   --This medication could also give you a yeast infection- please let us know if this occurs and we can treat it.     Increase your Mounajro to 7.5 mg weekly   If you are unable to get the Mounjaro- let me know and we will send in Ozempic instead [phone]       For blood pressure--   Start taking the Lisinopril AND the hctz

## 2024-04-22 NOTE — PROGRESS NOTES
CC:   Chief Complaint   Patient presents with    Diabetes Mellitus       HPI: Jasper Sarabia is a 54 y.o. male presents for an initial visit today for the management of T2DM     He was diagnosed with Type 2 diabetes in 2018 when he had a cerebral aneurysm - he had vision changes, severe HA, and tremors  Dx with a ruptured anterior communicating artery aneurysm.  This was treated with endovascular coil embolization on 05/25/18 and 6/14/18, and a  shunt was placed on 06/07/2018.  The patient returned for follow-up angiography a year later.  The primary aneurysm seem to be well coiled but there was a question of a small infundibulum adjacent to the primary aneurysm.  He underwent craniotomy and exploration of the anterior communicating artery area on 07/22/2019. It was found that there was a fenestration of the right A2 artery with branches coming from this and this was wrapped with cotton pledgets.     Family hx of diabetes: denies   Hospitalized for diabetes: denies   Insulin therapy: after his aneurysm - he was on insulin for a little while and then was on oral agents     No personal or FH of thyroid cancer or personal of pancreatic cancer or pancreatitis.     He is from [place]     He is wearing a silvia 2 sensor     His blood pressure is above goal in clinic today.  He has been taking the hydrochlorothiazide but has not been taking the lisinopril    He is fasting and can get some updated lab work today    He would like to be on Mounjaro if possible---he is aware there is a national back order        DIABETES COMPLICATIONS: cerebrovascular disease      Diabetes Management Status    ASA:  he takes a baby ASA 3 times per week     Statin: Taking-- Crestor 20 mg nightly   ACE/ARB: not Taking-- Lisinopril 10 mg daily       The ASCVD Risk score (Brett GASPAR, et al., 2019) failed to calculate for the following reasons:    The patient has a prior MI or stroke diagnosis      Screening or Prevention Patient's value Goal  "Complete/Controlled?   HgA1C Testing and Control   Lab Results   Component Value Date    HGBA1C 8.5 (H) 02/15/2024      Annually/Less than 8% No   Lipid profile : 11/29/2023 Annually Yes   LDL control Lab Results   Component Value Date    LDLCALC 96.2 11/29/2023    Annually/Less than 100 mg/dl  Yes   Nephropathy screening No results found for: "LABMICR"  No results found for: "PROTEINUA" Annually No   Blood pressure BP Readings from Last 1 Encounters:   04/22/24 (!) 154/90    Less than 140/90 No   Dilated retinal exam Most Recent Eye Exam Date: Not Found Annually Yes   Foot exam   : 04/22/2024 Annually No       CURRENT A1C:    Hemoglobin A1C   Date Value Ref Range Status   02/15/2024 8.5 (H) 4.0 - 5.6 % Final     Comment:     ADA Screening Guidelines:  5.7-6.4%  Consistent with prediabetes  >or=6.5%  Consistent with diabetes    High levels of fetal hemoglobin interfere with the HbA1C  assay. Heterozygous hemoglobin variants (HbS, HgC, etc)do  not significantly interfere with this assay.   However, presence of multiple variants may affect accuracy.     11/29/2023 9.0 (H) 4.0 - 5.6 % Final     Comment:     ADA Screening Guidelines:  5.7-6.4%  Consistent with prediabetes  >or=6.5%  Consistent with diabetes    High levels of fetal hemoglobin interfere with the HbA1C  assay. Heterozygous hemoglobin variants (HbS, HgC, etc)do  not significantly interfere with this assay.   However, presence of multiple variants may affect accuracy.     12/02/2019 8.6 (H) 4.0 - 5.6 % Final     Comment:     ADA Screening Guidelines:  5.7-6.4%  Consistent with prediabetes  >or=6.5%  Consistent with diabetes  High levels of fetal hemoglobin interfere with the HbA1C  assay. Heterozygous hemoglobin variants (HbS, HgC, etc)do  not significantly interfere with this assay.   However, presence of multiple variants may affect accuracy.         GOAL A1C: 6.5% without hypoglycemia     DM MEDICATIONS USED IN THE PAST: Morenita 2   Metformin   Trulicity  " -- never took   Mounjaro         CURRENT DIABETES MEDICATIONS: Metformin 1000 mg BID   Off the Mounjaro 5 mg weekly ( getting it from [place]) -- off for about 3 weeks now   Insulin: N/A   Missed doses: Yes      BLOOD GLUCOSE MONITORING:    Sensor type: silvia 2   Average BG readin  Time in range: 78%   17% high   5% very high   0% low   0% very low   GMI- 7%   Site change: q14 days.    Sensor was downloaded in clinic today and reviewed with patient.   Please see attached document for download.         HYPOGLYCEMIA:  0% low       MEALS: eating 3 meals per day          CURRENT EXERCISE:  Yes-- gym 4 times per week   Walk daily -- counts steps- 10,000 steps per day   Plays soccer once a week       Review of Systems  Review of Systems   Constitutional:  Negative for appetite change, fatigue and unexpected weight change.   HENT:  Negative for trouble swallowing.    Eyes:  Negative for visual disturbance.   Respiratory:  Negative for shortness of breath.    Cardiovascular:  Negative for chest pain.   Gastrointestinal:  Negative for nausea.   Endocrine: Negative for polydipsia, polyphagia and polyuria.   Genitourinary:         No Nocturia    Skin:  Negative for wound.   Neurological:  Negative for numbness.       Physical Exam   Physical Exam  Vitals and nursing note reviewed.   Constitutional:       General: He is not in acute distress.     Appearance: He is well-developed. He is obese. He is not ill-appearing.   HENT:      Head: Normocephalic and atraumatic.      Right Ear: External ear normal.      Left Ear: External ear normal.      Nose: Nose normal.   Neck:      Thyroid: No thyromegaly.      Trachea: No tracheal deviation.   Cardiovascular:      Rate and Rhythm: Normal rate and regular rhythm.      Heart sounds: No murmur heard.  Pulmonary:      Effort: Pulmonary effort is normal. No respiratory distress.      Breath sounds: Normal breath sounds.   Abdominal:      Palpations: Abdomen is soft.      Tenderness:  "There is no abdominal tenderness.      Hernia: No hernia is present.   Musculoskeletal:      Cervical back: Normal range of motion and neck supple.   Skin:     General: Skin is warm and dry.      Capillary Refill: Capillary refill takes less than 2 seconds.      Findings: No rash.      Comments: Injection sites are normal appearing. No lipo hypertropthy or atrophy     Neurological:      Mental Status: He is alert and oriented to person, place, and time.      Cranial Nerves: No cranial nerve deficit.   Psychiatric:         Behavior: Behavior normal.         Judgment: Judgment normal.         FOOT EXAMINATION: Appropriate footwear     Protective Sensation (w/ 10 gram monofilament):  Right: Intact  Left: Intact    Visual Inspection:  Normal -  Bilateral and Nails Intact - without Evidence of Foot Deformity- Bilateral    Pedal Pulses:   Right: Present  Left: Present    Posterior Tibialis Pulses:   Right:Present  Left: Present        No results found for: "TSH"        Type 2 diabetes mellitus with hyperglycemia, without long-term current use of insulin  Uncontrolled   Would like to see blood sugar readings a little bit lower.  Having some prandial excursions  Update A1c  Continue CGM   He wants to be on mounjaro-- reports it works better than Metformin   Would benefit from Jardiance as well         -- Medication Changes:   Start Jardiance 10 mg daily   -- Please let us know if you have nausea, vomiting, or weakness with a normal BG. This could be euglycemic DKA.  -- please hold medication 3 days prior to surgery or if you are sick and have decreased intake.   -- Please drink lots of water daily while on this medication.   --This medication could also give you a yeast infection- please let us know if this occurs and we can treat it.     Increase your Mounajro to 7.5 mg weekly   If you are unable to get the Mounjaro- let me know and we will send in Ozempic instead [phone]       -- Reviewed goals of therapy are to get " the best control we can without hypoglycemia.  -- Advised frequent self blood glucose monitoring.  Patient encouraged to document glucose results and bring them to every clinic visit. Continue silvia 2   -- Hypoglycemia precautions discussed. Instructed on precautions before driving.    -- Call for Bg repeatedly < 70 or > 180.   -- Close adherence to lifestyle changes recommended.   -- Periodic follow ups for eye evaluations, foot care and dental care suggested.        Essential hypertension  BP goal is < 140/90.    Blood pressure goals discussed with patient  Blood pressure above goal in clinic today  Encouraged patient to restart his lisinopril  Continue the hydrochlorothiazide  Come back in 2 weeks for a nurse visit for blood pressure check    Hyperlipidemia associated with type 2 diabetes mellitus  On statin per ADA recommendations  LDL goal < 70 (CVA hx)    Check lipids        Class 1 obesity due to excess calories with serious comorbidity and body mass index (BMI) of 34.0 to 34.9 in adult  Body mass index is 34.84 kg/m².  Increases insulin resistance.   Discussed DM diet and exercise.       History of cerebral aneurysm  Optimize BG readings.   See above.   May need a follow up with neurosurgery         I spent a total of 45 minutes on the day of the visit.This includes face to face time and non-face to face time preparing to see the patient (eg, review of tests), obtaining and/or reviewing separately obtained history, documenting clinical information in the electronic or other health record, independently interpreting results and communicating results to the patient/family/caregiver, or care coordinator.         Follow up in about 3 months (around 7/22/2024).  External eye records   Fasting labs today   Nurse visit in 2 weeks for BP check   Follow up with me in 3 months with labs prior         Orders Placed This Encounter   Procedures    Hemoglobin A1C     Standing Status:   Future     Number of Occurrences:    1     Standing Expiration Date:   10/22/2025    Comprehensive Metabolic Panel     Standing Status:   Future     Number of Occurrences:   1     Standing Expiration Date:   10/22/2025    Lipid Panel     Standing Status:   Future     Number of Occurrences:   1     Standing Expiration Date:   10/22/2025    Microalbumin/Creatinine Ratio, Urine     Standing Status:   Future     Number of Occurrences:   1     Standing Expiration Date:   10/22/2025     Order Specific Question:   Specimen Source     Answer:   Urine    TSH     Standing Status:   Future     Number of Occurrences:   1     Standing Expiration Date:   10/22/2025       Recommendations were discussed with the patient in detail  The patient verbalized understanding and agrees with the plan outlined as above.     This note was partly generated with [employer] voice recognition software. I apologize for any possible typographical errors.

## 2024-04-22 NOTE — ASSESSMENT & PLAN NOTE
BP goal is < 140/90.    Blood pressure goals discussed with patient  Blood pressure above goal in clinic today  Encouraged patient to restart his lisinopril  Continue the hydrochlorothiazide  Come back in 2 weeks for a nurse visit for blood pressure check

## 2024-05-01 ENCOUNTER — PATIENT OUTREACH (OUTPATIENT)
Dept: ADMINISTRATIVE | Facility: HOSPITAL | Age: 54
End: 2024-05-01
Payer: COMMERCIAL

## 2024-05-01 NOTE — PROGRESS NOTES
Health Maintenance Due   Topic Date Due    Pneumococcal Vaccines (Age 0-64) (1 of 2 - PCV) Never done    Eye Exam  Never done    Colorectal Cancer Screening  Never done    COVID-19 Vaccine (2 - 2023-24 season) 09/01/2023        Chart review done.    updated.   Immunizations reviewed & updated.   Care Everywhere updated.

## 2024-05-07 ENCOUNTER — CLINICAL SUPPORT (OUTPATIENT)
Dept: DIABETES | Facility: CLINIC | Age: 54
End: 2024-05-07
Payer: COMMERCIAL

## 2024-05-07 ENCOUNTER — TELEPHONE (OUTPATIENT)
Dept: DIABETES | Facility: CLINIC | Age: 54
End: 2024-05-07

## 2024-05-07 VITALS — DIASTOLIC BLOOD PRESSURE: 86 MMHG | SYSTOLIC BLOOD PRESSURE: 140 MMHG

## 2024-05-07 DIAGNOSIS — Z79.4 TYPE 2 DIABETES MELLITUS WITH HYPERGLYCEMIA, WITH LONG-TERM CURRENT USE OF INSULIN: Primary | ICD-10-CM

## 2024-05-07 DIAGNOSIS — I10 ESSENTIAL HYPERTENSION: Primary | ICD-10-CM

## 2024-05-07 DIAGNOSIS — E11.65 TYPE 2 DIABETES MELLITUS WITH HYPERGLYCEMIA, WITH LONG-TERM CURRENT USE OF INSULIN: Primary | ICD-10-CM

## 2024-05-07 PROCEDURE — 99999 PR PBB SHADOW E&M-EST. PATIENT-LVL I: CPT | Mod: PBBFAC,,,

## 2024-05-07 RX ORDER — LISINOPRIL 20 MG/1
20 TABLET ORAL DAILY
Qty: 90 TABLET | Refills: 3 | Status: SHIPPED | OUTPATIENT
Start: 2024-05-07 | End: 2025-05-07

## 2024-05-07 NOTE — TELEPHONE ENCOUNTER
Spoke to pt informed pt that provide increased lisinopril to 20mg daily, pt V/U , will f/u in about 2 weeks to recheck bp

## 2024-05-07 NOTE — PROGRESS NOTES
Verified pt name and , BP taken on left arm, while in sitting position, /86 Pt reports he is taking 10mg of lisinopril daily, and 12.5mg of hydrochlorothiazide daily.

## 2024-05-07 NOTE — TELEPHONE ENCOUNTER
----- Message from Janine Patel LPN sent at 2024  8:26 AM CDT -----  Verified pt name and , BP taken on left arm, while in sitting position, /86 Pt reports he is taking 10mg of lisinopril daily, and 12.5mg of hydrochlorothiazide daily.

## 2024-05-23 ENCOUNTER — CLINICAL SUPPORT (OUTPATIENT)
Dept: DIABETES | Facility: CLINIC | Age: 54
End: 2024-05-23
Payer: COMMERCIAL

## 2024-05-23 VITALS — SYSTOLIC BLOOD PRESSURE: 136 MMHG | DIASTOLIC BLOOD PRESSURE: 82 MMHG

## 2024-05-23 DIAGNOSIS — I10 HYPERTENSION, UNSPECIFIED TYPE: Primary | ICD-10-CM

## 2024-05-23 PROCEDURE — 99999 PR PBB SHADOW E&M-EST. PATIENT-LVL II: CPT | Mod: PBBFAC,,,

## 2024-05-23 NOTE — PROGRESS NOTES
Verified pt name and ,  pt blood pressure was taken on the left arm while in a sitting, position , BP read 136/82, pt reports taking 12.5mg of hydrochlorothiazide daily, and 20mg of lisinopril daily. Pt stated he will continue bp medication regimen.

## 2024-06-04 ENCOUNTER — PATIENT OUTREACH (OUTPATIENT)
Dept: ADMINISTRATIVE | Facility: HOSPITAL | Age: 54
End: 2024-06-04
Payer: COMMERCIAL

## 2024-06-06 ENCOUNTER — TELEPHONE (OUTPATIENT)
Dept: PRIMARY CARE CLINIC | Facility: CLINIC | Age: 54
End: 2024-06-06
Payer: COMMERCIAL

## 2024-06-06 VITALS — DIASTOLIC BLOOD PRESSURE: 82 MMHG | SYSTOLIC BLOOD PRESSURE: 136 MMHG

## 2024-06-18 ENCOUNTER — CLINICAL SUPPORT (OUTPATIENT)
Dept: PRIMARY CARE CLINIC | Facility: CLINIC | Age: 54
End: 2024-06-18
Attending: STUDENT IN AN ORGANIZED HEALTH CARE EDUCATION/TRAINING PROGRAM
Payer: COMMERCIAL

## 2024-06-18 ENCOUNTER — OFFICE VISIT (OUTPATIENT)
Dept: PRIMARY CARE CLINIC | Facility: CLINIC | Age: 54
End: 2024-06-18
Payer: COMMERCIAL

## 2024-06-18 VITALS
BODY MASS INDEX: 32.85 KG/M2 | TEMPERATURE: 98 F | RESPIRATION RATE: 16 BRPM | DIASTOLIC BLOOD PRESSURE: 80 MMHG | WEIGHT: 192.44 LBS | OXYGEN SATURATION: 98 % | HEIGHT: 64 IN | SYSTOLIC BLOOD PRESSURE: 124 MMHG | HEART RATE: 67 BPM

## 2024-06-18 DIAGNOSIS — I10 HTN (HYPERTENSION), BENIGN: ICD-10-CM

## 2024-06-18 DIAGNOSIS — E78.5 TYPE 2 DIABETES MELLITUS WITH HYPERLIPIDEMIA: ICD-10-CM

## 2024-06-18 DIAGNOSIS — Z00.00 ANNUAL PHYSICAL EXAM: Primary | ICD-10-CM

## 2024-06-18 DIAGNOSIS — E11.69 TYPE 2 DIABETES MELLITUS WITH HYPERLIPIDEMIA: ICD-10-CM

## 2024-06-18 PROCEDURE — 2025F 7 FLD RTA PHOTO W/O RTNOPTHY: CPT | Mod: CPTII,S$GLB,, | Performed by: OPTOMETRIST

## 2024-06-18 PROCEDURE — 1160F RVW MEDS BY RX/DR IN RCRD: CPT | Mod: CPTII,S$GLB,, | Performed by: STUDENT IN AN ORGANIZED HEALTH CARE EDUCATION/TRAINING PROGRAM

## 2024-06-18 PROCEDURE — 92228 IMG RTA DETC/MNTR DS PHY/QHP: CPT | Mod: 26,S$GLB,, | Performed by: OPTOMETRIST

## 2024-06-18 PROCEDURE — 3066F NEPHROPATHY DOC TX: CPT | Mod: CPTII,S$GLB,, | Performed by: STUDENT IN AN ORGANIZED HEALTH CARE EDUCATION/TRAINING PROGRAM

## 2024-06-18 PROCEDURE — 92228 IMG RTA DETC/MNTR DS PHY/QHP: CPT | Mod: TC,S$GLB,, | Performed by: STUDENT IN AN ORGANIZED HEALTH CARE EDUCATION/TRAINING PROGRAM

## 2024-06-18 PROCEDURE — 3079F DIAST BP 80-89 MM HG: CPT | Mod: CPTII,S$GLB,, | Performed by: STUDENT IN AN ORGANIZED HEALTH CARE EDUCATION/TRAINING PROGRAM

## 2024-06-18 PROCEDURE — 1159F MED LIST DOCD IN RCRD: CPT | Mod: CPTII,S$GLB,, | Performed by: STUDENT IN AN ORGANIZED HEALTH CARE EDUCATION/TRAINING PROGRAM

## 2024-06-18 PROCEDURE — 4010F ACE/ARB THERAPY RXD/TAKEN: CPT | Mod: CPTII,S$GLB,, | Performed by: STUDENT IN AN ORGANIZED HEALTH CARE EDUCATION/TRAINING PROGRAM

## 2024-06-18 PROCEDURE — 3074F SYST BP LT 130 MM HG: CPT | Mod: CPTII,S$GLB,, | Performed by: STUDENT IN AN ORGANIZED HEALTH CARE EDUCATION/TRAINING PROGRAM

## 2024-06-18 PROCEDURE — 3008F BODY MASS INDEX DOCD: CPT | Mod: CPTII,S$GLB,, | Performed by: STUDENT IN AN ORGANIZED HEALTH CARE EDUCATION/TRAINING PROGRAM

## 2024-06-18 PROCEDURE — 3061F NEG MICROALBUMINURIA REV: CPT | Mod: CPTII,S$GLB,, | Performed by: STUDENT IN AN ORGANIZED HEALTH CARE EDUCATION/TRAINING PROGRAM

## 2024-06-18 PROCEDURE — 99396 PREV VISIT EST AGE 40-64: CPT | Mod: S$GLB,,, | Performed by: STUDENT IN AN ORGANIZED HEALTH CARE EDUCATION/TRAINING PROGRAM

## 2024-06-18 PROCEDURE — 3044F HG A1C LEVEL LT 7.0%: CPT | Mod: CPTII,S$GLB,, | Performed by: STUDENT IN AN ORGANIZED HEALTH CARE EDUCATION/TRAINING PROGRAM

## 2024-06-18 PROCEDURE — 99999 PR PBB SHADOW E&M-EST. PATIENT-LVL IV: CPT | Mod: PBBFAC,,, | Performed by: STUDENT IN AN ORGANIZED HEALTH CARE EDUCATION/TRAINING PROGRAM

## 2024-06-18 NOTE — PROGRESS NOTES
"Subjective:       Patient ID: Jasper Sarabia is a 54 y.o. male.    Chief Complaint: Follow-up (HTN & DM)      HPI:  54 y.o. 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   HENT:  Negative for congestion, sinus pressure, sneezing and sore throat.    Respiratory:  Negative for cough and shortness of breath.    Cardiovascular:  Negative for chest pain and palpitations.   Gastrointestinal:  Negative for abdominal pain, diarrhea, nausea and vomiting.   Musculoskeletal:  Negative for arthralgias, joint swelling and myalgias.   Skin:  Negative for rash and wound.   Neurological:  Negative for dizziness, light-headedness and headaches.       Objective:      Vitals:    06/18/24 0801   BP: 124/80   BP Location: Left arm   Patient Position: Sitting   BP Method: Medium (Manual)   Pulse: 67   Resp: 16   Temp: 98 °F (36.7 °C)   TempSrc: Oral   SpO2: 98%   Weight: 87.3 kg (192 lb 7.4 oz)   Height: 5' 4" (1.626 m)     Physical Exam  Vitals reviewed.   Constitutional:       General: He is not in acute distress.     Appearance: Normal appearance. He is not ill-appearing.   HENT:      Head: Normocephalic and atraumatic.   Eyes:      General:         Right eye: No discharge.         Left eye: No discharge.      Conjunctiva/sclera: Conjunctivae normal.   Cardiovascular: " "     Rate and Rhythm: Normal rate and regular rhythm.      Pulses: Normal pulses.      Heart sounds: No murmur heard.  Pulmonary:      Effort: Pulmonary effort is normal.      Breath sounds: Normal breath sounds.   Musculoskeletal:         General: No deformity.      Cervical back: Neck supple. No rigidity.   Lymphadenopathy:      Cervical: No cervical adenopathy.   Skin:     General: Skin is warm and dry.      Coloration: Skin is not jaundiced.   Neurological:      General: No focal deficit present.      Mental Status: He is alert and oriented to person, place, and time.   Psychiatric:         Mood and Affect: Mood normal.         Behavior: Behavior normal.             Lab Results   Component Value Date     04/22/2024    K 4.0 04/22/2024     04/22/2024    CO2 23 04/22/2024    BUN 12 04/22/2024    CREATININE 0.8 04/22/2024    ANIONGAP 10 04/22/2024     Lab Results   Component Value Date    HGBA1C 6.7 (H) 04/22/2024     No results found for: "BNP", "BNPTRIAGEBLO"    Lab Results   Component Value Date    WBC 6.85 11/29/2023    HGB 14.1 11/29/2023    HCT 41.9 11/29/2023     11/29/2023    GRAN 4.3 11/29/2023    GRAN 62.4 11/29/2023     Lab Results   Component Value Date    CHOL 112 (L) 04/22/2024    HDL 36 (L) 04/22/2024    LDLCALC 36.8 (L) 04/22/2024    TRIG 196 (H) 04/22/2024          Current Outpatient Medications:     flash glucose sensor (FREESTYLE RO 2 SENSOR) Kit, 1 Device by Misc.(Non-Drug; Combo Route) route every 14 (fourteen) days., Disp: 2 kit, Rfl: 11    hydroCHLOROthiazide (HYDRODIURIL) 12.5 MG Tab, Take 1 tablet (12.5 mg total) by mouth once daily., Disp: 90 tablet, Rfl: 1    lisinopriL (PRINIVIL,ZESTRIL) 20 MG tablet, Take 1 tablet (20 mg total) by mouth once daily., Disp: 90 tablet, Rfl: 3    rosuvastatin (CRESTOR) 20 MG tablet, Take 1 tablet (20 mg total) by mouth once daily., Disp: 90 tablet, Rfl: 3    tirzepatide (MOUNJARO) 7.5 mg/0.5 mL PnIj, Inject 7.5 mg into the skin every 7 " days., Disp: 4 Pen, Rfl: 5    blood sugar diagnostic Strp, To check BG once daily when fasting, to use with insurance preferred meter (Patient not taking: Reported on 4/22/2024), Disp: 100 each, Rfl: 3    blood-glucose meter kit, To check BG once daily when fasting, to use with insurance preferred meter (Patient not taking: Reported on 4/22/2024), Disp: 1 each, Rfl: 0    lancets Misc, To check BG once daily when fasting, to use with insurance preferred meter (Patient not taking: Reported on 4/22/2024), Disp: 100 each, Rfl: 3        Assessment:       1. Annual physical exam    2. Type 2 diabetes mellitus with hyperlipidemia    3. HTN (hypertension), benign           Plan:       Annual physical exam  Type 2 diabetes mellitus with hyperlipidemia  HTN (hypertension), benign  -     Diabetic Eye Screening Photo; Future  - Diet and exercise recommendation provided today's visit, patient is doing very well on current regimen  - Can attempt trial off of HCTZ if BP continues to trend down    RTC in 1 year

## 2024-07-24 ENCOUNTER — OFFICE VISIT (OUTPATIENT)
Dept: PRIMARY CARE CLINIC | Facility: CLINIC | Age: 54
End: 2024-07-24
Payer: COMMERCIAL

## 2024-07-24 VITALS
HEART RATE: 68 BPM | BODY MASS INDEX: 32.48 KG/M2 | SYSTOLIC BLOOD PRESSURE: 130 MMHG | RESPIRATION RATE: 19 BRPM | OXYGEN SATURATION: 99 % | WEIGHT: 190.25 LBS | DIASTOLIC BLOOD PRESSURE: 78 MMHG | HEIGHT: 64 IN

## 2024-07-24 DIAGNOSIS — I10 ESSENTIAL HYPERTENSION: ICD-10-CM

## 2024-07-24 DIAGNOSIS — R10.11 ABDOMINAL PAIN, RIGHT UPPER QUADRANT: ICD-10-CM

## 2024-07-24 DIAGNOSIS — E11.65 TYPE 2 DIABETES MELLITUS WITH HYPERGLYCEMIA, WITHOUT LONG-TERM CURRENT USE OF INSULIN: ICD-10-CM

## 2024-07-24 DIAGNOSIS — Z86.79 HISTORY OF CEREBRAL ANEURYSM: ICD-10-CM

## 2024-07-24 DIAGNOSIS — R10.13 ABDOMINAL PAIN, EPIGASTRIC: Primary | ICD-10-CM

## 2024-07-24 DIAGNOSIS — R10.13 EPIGASTRIC PAIN: ICD-10-CM

## 2024-07-24 PROCEDURE — 3061F NEG MICROALBUMINURIA REV: CPT | Mod: CPTII,S$GLB,, | Performed by: FAMILY MEDICINE

## 2024-07-24 PROCEDURE — 3066F NEPHROPATHY DOC TX: CPT | Mod: CPTII,S$GLB,, | Performed by: FAMILY MEDICINE

## 2024-07-24 PROCEDURE — 3075F SYST BP GE 130 - 139MM HG: CPT | Mod: CPTII,S$GLB,, | Performed by: FAMILY MEDICINE

## 2024-07-24 PROCEDURE — 99999 PR PBB SHADOW E&M-EST. PATIENT-LVL III: CPT | Mod: PBBFAC,,, | Performed by: FAMILY MEDICINE

## 2024-07-24 PROCEDURE — 1159F MED LIST DOCD IN RCRD: CPT | Mod: CPTII,S$GLB,, | Performed by: FAMILY MEDICINE

## 2024-07-24 PROCEDURE — 4010F ACE/ARB THERAPY RXD/TAKEN: CPT | Mod: CPTII,S$GLB,, | Performed by: FAMILY MEDICINE

## 2024-07-24 PROCEDURE — 3044F HG A1C LEVEL LT 7.0%: CPT | Mod: CPTII,S$GLB,, | Performed by: FAMILY MEDICINE

## 2024-07-24 PROCEDURE — 99214 OFFICE O/P EST MOD 30 MIN: CPT | Mod: S$GLB,,, | Performed by: FAMILY MEDICINE

## 2024-07-24 PROCEDURE — 3008F BODY MASS INDEX DOCD: CPT | Mod: CPTII,S$GLB,, | Performed by: FAMILY MEDICINE

## 2024-07-24 PROCEDURE — 3078F DIAST BP <80 MM HG: CPT | Mod: CPTII,S$GLB,, | Performed by: FAMILY MEDICINE

## 2024-07-24 RX ORDER — OMEPRAZOLE 40 MG/1
40 CAPSULE, DELAYED RELEASE ORAL DAILY
Qty: 30 CAPSULE | Refills: 5 | Status: SHIPPED | OUTPATIENT
Start: 2024-07-24 | End: 2025-07-24

## 2024-07-24 RX ORDER — ONDANSETRON 4 MG/1
4 TABLET, FILM COATED ORAL EVERY 6 HOURS PRN
Qty: 30 TABLET | Refills: 2 | Status: SHIPPED | OUTPATIENT
Start: 2024-07-24

## 2024-07-24 NOTE — PROGRESS NOTES
Subjective:       Patient ID: Jasper Sarabia is a 54 y.o. male.    Chief Complaint: Abdominal Pain and Diarrhea    HPI:  54-year-old white male in for stomach issues x1 week with abdominal pain and diarrhea  Abdominal pain usually in the right upper quadrant---also occasionally in the epigastric area just below the xiphoid process---and occasionally the back---appears to be gas moving  Gets pain 5-6 times per day---lasts around 10 50 minutes and gone---quality--crampy pain--severity--6-7/10  Patient had diarrhea initially in after the diarrheal episode stopped the pain started  States whole family has ulcer problems---no history hepatitis gallbladder disease or ulcers--no melena hematochezia hematemesis  History of hypertension blood pressure 130/78 patient on lisinopril 20 mg hydrochlorothiazide 12.5  History hyperlipidemia on Crestor  On Mounjaro--x 4 months   Had brain surgery--2019 repair of an aneurysm with shunt placement  Type 2 diabetes--why on Mounjaro---Glu 106-170 hemoglobin A1c was 9 then 8.7 now 6.7    ROS:  Skin: no psoriasis, eczema, skin cancer  HEENT: No headache, ocular pain, blurred vision, diplopia, epistaxis, hoarseness change in voice, thyroid trouble  Lung: No pneumonia, asthma, Tb, wheezing, SOB, no smoking  Heart: No chest pain, ankle edema, palpitations, MI, bobby murmur, +hypertension,+ hyperlipidemia--no stent bypass arrhythmia  Abdomen: No nausea, vomiting,+ diarrhea then abd pain see HPI ,no  constipation, ulcers, hepatitis, gallbladder disease, melena, hematochezia, hematemesis  : no UTI, renal disease, stones prostate  MS: no fractures, O/A, lupus, rheumatoid, gout--history fractured left arm x3  Neuro: No dizziness, LOC, seizures   +diabetes, no anemia, no anxiety, no depression    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 with wife       Objective:   Physical Exam:  General: Well nourished, well developed, no acute distress  Skin: No lesions  HEENT: Eyes PERRLA, EOM intact,  nose patent, throat non-erythematous   NECK: Supple, no bruits, No JVD, no nodes  Lungs: Clear, no rales, rhonchi, wheezing  Heart: Regular rate and rhythm, no murmurs, gallops, or rubs  Abdomen: flat, bowel sounds positive, no tenderness, or organomegaly--no significant findings at this time  MS: Range of motion and muscle strength intact  Neuro: Alert, CN intact, oriented X 3  Extremities: No cyanosis, clubbing, or edema         Assessment:       1. Abdominal pain, epigastric    2. Abdominal pain, right upper quadrant    3. Essential hypertension    4. Type 2 diabetes mellitus with hyperglycemia, without long-term current use of insulin    5. History of cerebral aneurysm    6. Epigastric pain        Plan:       Abdominal pain, epigastric  -     LIPASE; Future; Expected date: 07/24/2024    Abdominal pain, right upper quadrant    Essential hypertension    Type 2 diabetes mellitus with hyperglycemia, without long-term current use of insulin  -     Case Request Endoscopy: COLONOSCOPY  -     CBC Auto Differential; Future; Expected date: 07/24/2024  -     Comprehensive Metabolic Panel; Future; Expected date: 07/24/2024  -     Hemoglobin A1C; Future; Expected date: 07/24/2024    History of cerebral aneurysm    Epigastric pain  -     CT Abdomen Pelvis  Without Contrast; Future; Expected date: 07/24/2024          Main Reason for Visit  Abdominal pain epigastric--and abdominal pain right upper quadrant--occasionally right flank--had episode of diarrhea prior to abdominal pain starting--only lasted 1 day---patient was on Mounjaro--has stopped it  Omeprazole 40 mg 1 p.o. q.d.---Zofran 4 mg 1 p.o. q.6 hours p.r.n. nausea vomiting cramps---CBCs CMP lipase--CT scan of the abdomen---if pain gets worse go to the emergency room   Hypertension on lisinopril and H  Type 2 diabetes on Mounjaro--when symptoms 1st occurred glucose was running in the 50 60s  Lab CBCs CMP lipase hemoglobin A1c CT scan abdomen pelvis  Hyperlipidemia on  Crestor

## 2024-08-12 ENCOUNTER — PATIENT OUTREACH (OUTPATIENT)
Dept: ADMINISTRATIVE | Facility: HOSPITAL | Age: 54
End: 2024-08-12
Payer: COMMERCIAL

## 2024-08-12 NOTE — PROGRESS NOTES
Health Maintenance Due   Topic Date Due    Colorectal Cancer Screening  Never done        Chart review done.   HM updated.   Immunizations reviewed & updated.   Care Everywhere updated.

## 2024-08-13 DIAGNOSIS — I10 ESSENTIAL HYPERTENSION: ICD-10-CM

## 2024-08-13 DIAGNOSIS — E11.65 TYPE 2 DIABETES MELLITUS WITH HYPERGLYCEMIA, WITHOUT LONG-TERM CURRENT USE OF INSULIN: Primary | ICD-10-CM

## 2024-08-13 RX ORDER — TIRZEPATIDE 5 MG/.5ML
5 INJECTION, SOLUTION SUBCUTANEOUS
Qty: 4 PEN | Refills: 3 | Status: SHIPPED | OUTPATIENT
Start: 2024-08-13 | End: 2024-08-13 | Stop reason: SDUPTHER

## 2024-08-13 RX ORDER — LISINOPRIL 20 MG/1
20 TABLET ORAL DAILY
Qty: 90 TABLET | Refills: 3 | Status: CANCELLED | OUTPATIENT
Start: 2024-08-13 | End: 2025-08-13

## 2024-08-13 NOTE — TELEPHONE ENCOUNTER
Pt stated that he wants to decrease mounjaro to 5mg, also stated he wants to decrease lisinopril dose back to 1mg, states the lisinopril is causing abdominal pain when taken, stated he stopped taking for a few days, and he had not stomach pain,  stated that I would send provider a message to advise

## 2024-08-14 RX ORDER — LISINOPRIL 20 MG/1
20 TABLET ORAL DAILY
Qty: 90 TABLET | Refills: 3 | Status: SHIPPED | OUTPATIENT
Start: 2024-08-14 | End: 2025-08-14

## 2024-08-14 RX ORDER — TIRZEPATIDE 5 MG/.5ML
5 INJECTION, SOLUTION SUBCUTANEOUS
Qty: 4 PEN | Refills: 3 | Status: SHIPPED | OUTPATIENT
Start: 2024-08-14

## 2024-08-26 ENCOUNTER — PATIENT OUTREACH (OUTPATIENT)
Dept: ADMINISTRATIVE | Facility: HOSPITAL | Age: 54
End: 2024-08-26
Payer: COMMERCIAL

## 2024-08-26 NOTE — PROGRESS NOTES
Health Maintenance Due   Topic Date Due    Colorectal Cancer Screening  Never done     Immunizations - reviewed and updated   Care Everywhere - triggered   Care Teams -   Outreach - Chart review done. Case request for screening colonoscopy placed by PCP. Staff message sent to Colorectal Department in regards to calling patient to schedule

## 2024-08-26 NOTE — PROGRESS NOTES
CC:   Chief Complaint   Patient presents with    Diabetes Mellitus       HPI: Jasper Sarabia is a 54 y.o. male presents for a follow up  visit today for the management of T2DM     He was diagnosed with Type 2 diabetes in 2018 when he had a cerebral aneurysm - he had vision changes, severe HA, and tremors  Dx with a ruptured anterior communicating artery aneurysm.  This was treated with endovascular coil embolization on 05/25/18 and 6/14/18, and a  shunt was placed on 06/07/2018.  The patient returned for follow-up angiography a year later.  The primary aneurysm seem to be well coiled but there was a question of a small infundibulum adjacent to the primary aneurysm.  He underwent craniotomy and exploration of the anterior communicating artery area on 07/22/2019. It was found that there was a fenestration of the right A2 artery with branches coming from this and this was wrapped with cotton pledgets.     Family hx of diabetes: denies   Hospitalized for diabetes: denies   Insulin therapy: after his aneurysm - he was on insulin for a little while and then was on oral agents     No personal or FH of thyroid cancer or personal of pancreatic cancer or pancreatitis.     He is from [place]       Our 1st and last visit was in April of 2024  At that visit we started him on Jardiance 10 mg daily and increased his Mounjaro to 7.5 mg weekly-- stopped the ozempic   He went back down to mounjaro to 5 mg weekly due to GI upset with the 7.5 mg weekly - doing well on the 5 mg weekly dose   He never tried the Jardiance due to cost   Continued the Morenita 2  A1c has improved to 5.9%   Stopped his Crestor -- no reason why   Denied SE   See attached morenita download   Weight from 203# to 194#                         DIABETES COMPLICATIONS: cerebrovascular disease      Diabetes Management Status    ASA:  he takes a baby ASA 3 times per week     Statin: not taking the crestor right now   ACE/ARB: Taking-- Lisinopril 20 mg daily -- reports  "compliance       The ASCVD Risk score (Brett GASPAR, et al., 2019) failed to calculate for the following reasons:    The patient has a prior MI or stroke diagnosis      Screening or Prevention Patient's value Goal Complete/Controlled?   HgA1C Testing and Control   Lab Results   Component Value Date    HGBA1C 5.9 (H) 08/20/2024      Annually/Less than 8% No   Lipid profile : 04/22/2024 Annually Yes   LDL control Lab Results   Component Value Date    LDLCALC 36.8 (L) 04/22/2024    Annually/Less than 100 mg/dl  Yes   Nephropathy screening Lab Results   Component Value Date    LABMICR 10.0 04/22/2024     No results found for: "PROTEINUA" Annually No   Blood pressure BP Readings from Last 1 Encounters:   08/27/24 (!) 142/94    Less than 140/90 No   Dilated retinal exam : 06/18/2024 Annually Yes   Foot exam   : 04/22/2024 Annually No       CURRENT A1C:    Hemoglobin A1C   Date Value Ref Range Status   08/20/2024 5.9 (H) 4.0 - 5.6 % Final     Comment:     ADA Screening Guidelines:  5.7-6.4%  Consistent with prediabetes  >or=6.5%  Consistent with diabetes    High levels of fetal hemoglobin interfere with the HbA1C  assay. Heterozygous hemoglobin variants (HbS, HgC, etc)do  not significantly interfere with this assay.   However, presence of multiple variants may affect accuracy.     04/22/2024 6.7 (H) 4.0 - 5.6 % Final     Comment:     ADA Screening Guidelines:  5.7-6.4%  Consistent with prediabetes  >or=6.5%  Consistent with diabetes    High levels of fetal hemoglobin interfere with the HbA1C  assay. Heterozygous hemoglobin variants (HbS, HgC, etc)do  not significantly interfere with this assay.   However, presence of multiple variants may affect accuracy.     02/15/2024 8.5 (H) 4.0 - 5.6 % Final     Comment:     ADA Screening Guidelines:  5.7-6.4%  Consistent with prediabetes  >or=6.5%  Consistent with diabetes    High levels of fetal hemoglobin interfere with the HbA1C  assay. Heterozygous hemoglobin variants (HbS, HgC, " etc)do  not significantly interfere with this assay.   However, presence of multiple variants may affect accuracy.         GOAL A1C: 6.5% without hypoglycemia     DM MEDICATIONS USED IN THE PAST: Morenita 2   Metformin   Trulicity  -- never took   Mounjaro -- cannot tolerate the 7.5 mg weekly dose   Jardiance - never tried due to cost         CURRENT DIABETES MEDICATIONS: Mounjaro 5 mg -- he will take 1 shot every 3-4 weeks -- he just started back last week   Insulin: N/A   Missed doses: Yes      BLOOD GLUCOSE MONITORING:    Sensor type: morenita 2   Average BG readin  Time in range: 94%   4% high   0% very high   2% low   0% very low   GMI- 7%   Site change: q14 days.    Sensor was downloaded in clinic today and reviewed with patient.   Please see attached document for download.         HYPOGLYCEMIA:  2% low         MEALS: eating 1-2 meals per day   Eats lunch   Dinner will be salad or avocado and cheese          CURRENT EXERCISE:  Yes-- gym 4 times per week   Walk daily -- counts steps- 16,000 steps per day   Plays soccer once a week         Review of Systems  Review of Systems   Constitutional:  Negative for appetite change, fatigue and unexpected weight change.   HENT:  Negative for trouble swallowing.    Eyes:  Negative for visual disturbance.   Respiratory:  Negative for shortness of breath.    Cardiovascular:  Negative for chest pain.   Gastrointestinal:  Negative for nausea.   Endocrine: Negative for polydipsia, polyphagia and polyuria.   Genitourinary:         No Nocturia    Skin:  Negative for wound.   Neurological:  Negative for numbness.         Physical Exam   Physical Exam  Vitals and nursing note reviewed.   Constitutional:       General: He is not in acute distress.     Appearance: He is well-developed. He is obese. He is not ill-appearing.   HENT:      Head: Normocephalic and atraumatic.      Right Ear: External ear normal.      Left Ear: External ear normal.      Nose: Nose normal.   Neck:       Thyroid: No thyromegaly.      Trachea: No tracheal deviation.   Cardiovascular:      Rate and Rhythm: Normal rate and regular rhythm.      Heart sounds: No murmur heard.  Pulmonary:      Effort: Pulmonary effort is normal. No respiratory distress.      Breath sounds: Normal breath sounds.   Abdominal:      Palpations: Abdomen is soft.      Tenderness: There is no abdominal tenderness.      Hernia: No hernia is present.   Musculoskeletal:      Cervical back: Normal range of motion and neck supple.   Skin:     General: Skin is warm and dry.      Capillary Refill: Capillary refill takes less than 2 seconds.      Findings: No rash.      Comments: Injection sites and morenita sites are normal appearing. No lipo hypertropthy or atrophy     Neurological:      Mental Status: He is alert and oriented to person, place, and time.      Cranial Nerves: No cranial nerve deficit.   Psychiatric:         Behavior: Behavior normal.         Judgment: Judgment normal.         FOOT EXAMINATION: Appropriate footwear         Lab Results   Component Value Date    TSH 1.509 04/22/2024           Type 2 diabetes mellitus with hyperglycemia, without long-term current use of insulin  Well controlled   Continue CGM   He wants to be on mounjaro-- reports it works better than Metformin   Jardiance was too expensive         -- Medication Changes:   Continue Mounjaro 5 mg weekly    Jardiance was too expensive and unable to afford    Continue Morenita 2      -- Reviewed goals of therapy are to get the best control we can without hypoglycemia.  -- Advised frequent self blood glucose monitoring.  Patient encouraged to document glucose results and bring them to every clinic visit. Continue morenita 2   -- Hypoglycemia precautions discussed. Instructed on precautions before driving.    -- Call for Bg repeatedly < 70 or > 180.   -- Close adherence to lifestyle changes recommended.   -- Periodic follow ups for eye evaluations, foot care and dental care  suggested.      Discussed colonoscopy      Essential hypertension  BP goal is < 140/90.    Blood pressure goals discussed with patient  Blood pressure above goal in clinic today  Off HCTZ and on lisinopril 20 mg daily  Reports compliance but has not had his lisinopril this morning  Come back in 2 weeks for a blood pressure check to see if medication adjustments are needed  Encouraged patient to monitor blood pressure at home    Hyperlipidemia associated with type 2 diabetes mellitus  On statin per ADA recommendations  LDL goal < 70 (CVA hx)    Last LDL was at goal in April  Encouraged patient to restart the Crestor  Discussed ADA recommendations    Class 1 obesity due to excess calories with serious comorbidity and body mass index (BMI) of 33.0 to 33.9 in adult  Body mass index is 33.4 kg/m².  Increases insulin resistance.   Discussed DM diet and exercise.       History of cerebral aneurysm  Optimize BG readings.   See above.   May need a follow up with neurosurgery           I spent a total of 30 minutes on the day of the visit.This includes face to face time and non-face to face time preparing to see the patient (eg, review of tests), obtaining and/or reviewing separately obtained history, documenting clinical information in the electronic or other health record, independently interpreting results and communicating results to the patient/family/caregiver, or care coordinator.           Follow up in about 6 months (around 2/27/2025).  Follow up with me in 6 months with labs prior   Needs to schedule colonoscopy   Nurse visit in 2 weeks for BP check             Orders Placed This Encounter   Procedures    CBC Auto Differential     Standing Status:   Future     Standing Expiration Date:   2/27/2026    Comprehensive Metabolic Panel     Standing Status:   Future     Standing Expiration Date:   2/27/2026    Hemoglobin A1C     Standing Status:   Future     Standing Expiration Date:   2/27/2026    Lipid Panel     Standing  Status:   Future     Standing Expiration Date:   2/27/2026    Microalbumin/Creatinine Ratio, Urine     Standing Status:   Future     Standing Expiration Date:   2/27/2026     Order Specific Question:   Specimen Source     Answer:   Urine    TSH     Standing Status:   Future     Standing Expiration Date:   2/27/2026       Recommendations were discussed with the patient in detail  The patient verbalized understanding and agrees with the plan outlined as above.     This note was partly generated with [employer] voice recognition software. I apologize for any possible typographical errors.

## 2024-08-27 ENCOUNTER — TELEPHONE (OUTPATIENT)
Dept: GASTROENTEROLOGY | Facility: CLINIC | Age: 54
End: 2024-08-27
Payer: COMMERCIAL

## 2024-08-27 ENCOUNTER — OFFICE VISIT (OUTPATIENT)
Dept: DIABETES | Facility: CLINIC | Age: 54
End: 2024-08-27
Payer: COMMERCIAL

## 2024-08-27 ENCOUNTER — PATIENT MESSAGE (OUTPATIENT)
Dept: PULMONOLOGY | Facility: CLINIC | Age: 54
End: 2024-08-27
Payer: COMMERCIAL

## 2024-08-27 VITALS
DIASTOLIC BLOOD PRESSURE: 94 MMHG | HEIGHT: 64 IN | OXYGEN SATURATION: 99 % | HEART RATE: 78 BPM | SYSTOLIC BLOOD PRESSURE: 142 MMHG | BODY MASS INDEX: 33.23 KG/M2 | WEIGHT: 194.63 LBS

## 2024-08-27 DIAGNOSIS — E11.69 HYPERLIPIDEMIA ASSOCIATED WITH TYPE 2 DIABETES MELLITUS: ICD-10-CM

## 2024-08-27 DIAGNOSIS — E11.69 TYPE 2 DIABETES MELLITUS WITH HYPERLIPIDEMIA: ICD-10-CM

## 2024-08-27 DIAGNOSIS — Z71.9 HEALTH EDUCATION/COUNSELING: ICD-10-CM

## 2024-08-27 DIAGNOSIS — Z86.73 HISTORY OF STROKE: ICD-10-CM

## 2024-08-27 DIAGNOSIS — E66.09 CLASS 1 OBESITY DUE TO EXCESS CALORIES WITH SERIOUS COMORBIDITY AND BODY MASS INDEX (BMI) OF 33.0 TO 33.9 IN ADULT: ICD-10-CM

## 2024-08-27 DIAGNOSIS — I10 ESSENTIAL HYPERTENSION: ICD-10-CM

## 2024-08-27 DIAGNOSIS — E78.5 HYPERLIPIDEMIA ASSOCIATED WITH TYPE 2 DIABETES MELLITUS: ICD-10-CM

## 2024-08-27 DIAGNOSIS — E11.65 TYPE 2 DIABETES MELLITUS WITH HYPERGLYCEMIA, WITHOUT LONG-TERM CURRENT USE OF INSULIN: Primary | ICD-10-CM

## 2024-08-27 DIAGNOSIS — Z86.79 HISTORY OF CEREBRAL ANEURYSM: ICD-10-CM

## 2024-08-27 DIAGNOSIS — I10 ELEVATED BLOOD PRESSURE READING IN OFFICE WITH DIAGNOSIS OF HYPERTENSION: ICD-10-CM

## 2024-08-27 DIAGNOSIS — E78.5 TYPE 2 DIABETES MELLITUS WITH HYPERLIPIDEMIA: ICD-10-CM

## 2024-08-27 PROCEDURE — 3077F SYST BP >= 140 MM HG: CPT | Mod: CPTII,S$GLB,, | Performed by: NURSE PRACTITIONER

## 2024-08-27 PROCEDURE — 3008F BODY MASS INDEX DOCD: CPT | Mod: CPTII,S$GLB,, | Performed by: NURSE PRACTITIONER

## 2024-08-27 PROCEDURE — 3066F NEPHROPATHY DOC TX: CPT | Mod: CPTII,S$GLB,, | Performed by: NURSE PRACTITIONER

## 2024-08-27 PROCEDURE — 3061F NEG MICROALBUMINURIA REV: CPT | Mod: CPTII,S$GLB,, | Performed by: NURSE PRACTITIONER

## 2024-08-27 PROCEDURE — 95251 CONT GLUC MNTR ANALYSIS I&R: CPT | Mod: S$GLB,,, | Performed by: NURSE PRACTITIONER

## 2024-08-27 PROCEDURE — 1159F MED LIST DOCD IN RCRD: CPT | Mod: CPTII,S$GLB,, | Performed by: NURSE PRACTITIONER

## 2024-08-27 PROCEDURE — 3044F HG A1C LEVEL LT 7.0%: CPT | Mod: CPTII,S$GLB,, | Performed by: NURSE PRACTITIONER

## 2024-08-27 PROCEDURE — 4010F ACE/ARB THERAPY RXD/TAKEN: CPT | Mod: CPTII,S$GLB,, | Performed by: NURSE PRACTITIONER

## 2024-08-27 PROCEDURE — 99999 PR PBB SHADOW E&M-EST. PATIENT-LVL IV: CPT | Mod: PBBFAC,,, | Performed by: NURSE PRACTITIONER

## 2024-08-27 PROCEDURE — 1160F RVW MEDS BY RX/DR IN RCRD: CPT | Mod: CPTII,S$GLB,, | Performed by: NURSE PRACTITIONER

## 2024-08-27 PROCEDURE — 3079F DIAST BP 80-89 MM HG: CPT | Mod: CPTII,S$GLB,, | Performed by: NURSE PRACTITIONER

## 2024-08-27 PROCEDURE — 99214 OFFICE O/P EST MOD 30 MIN: CPT | Mod: S$GLB,,, | Performed by: NURSE PRACTITIONER

## 2024-08-27 RX ORDER — POLYETHYLENE GLYCOL 3350, SODIUM SULFATE ANHYDROUS, SODIUM BICARBONATE, SODIUM CHLORIDE, POTASSIUM CHLORIDE 236; 22.74; 6.74; 5.86; 2.97 G/4L; G/4L; G/4L; G/4L; G/4L
4 POWDER, FOR SOLUTION ORAL ONCE
Qty: 4000 ML | Refills: 0 | Status: CANCELLED | OUTPATIENT
Start: 2024-08-27 | End: 2024-08-27

## 2024-08-27 RX ORDER — ROSUVASTATIN CALCIUM 20 MG/1
20 TABLET, COATED ORAL DAILY
Qty: 90 TABLET | Refills: 3 | Status: SHIPPED | OUTPATIENT
Start: 2024-08-27 | End: 2025-08-27

## 2024-08-27 RX ORDER — POLYETHYLENE GLYCOL 3350, SODIUM SULFATE ANHYDROUS, SODIUM BICARBONATE, SODIUM CHLORIDE, POTASSIUM CHLORIDE 236; 22.74; 6.74; 5.86; 2.97 G/4L; G/4L; G/4L; G/4L; G/4L
4 POWDER, FOR SOLUTION ORAL ONCE
Qty: 4000 ML | Refills: 0 | Status: SHIPPED | OUTPATIENT
Start: 2024-08-27 | End: 2024-08-27

## 2024-08-27 RX ORDER — TIRZEPATIDE 5 MG/.5ML
5 INJECTION, SOLUTION SUBCUTANEOUS
Qty: 4 PEN | Refills: 6 | Status: SHIPPED | OUTPATIENT
Start: 2024-08-27

## 2024-08-27 RX ORDER — FLASH GLUCOSE SENSOR
1 KIT MISCELLANEOUS
Qty: 2 KIT | Refills: 11 | Status: SHIPPED | OUTPATIENT
Start: 2024-08-27

## 2024-08-27 NOTE — ASSESSMENT & PLAN NOTE
Body mass index is 33.4 kg/m².  Increases insulin resistance.   Discussed DM diet and exercise.

## 2024-08-27 NOTE — ASSESSMENT & PLAN NOTE
BP goal is < 140/90.    Blood pressure goals discussed with patient  Blood pressure above goal in clinic today  Off HCTZ and on lisinopril 20 mg daily  Reports compliance but has not had his lisinopril this morning  Come back in 2 weeks for a blood pressure check to see if medication adjustments are needed  Encouraged patient to monitor blood pressure at home

## 2024-08-27 NOTE — ASSESSMENT & PLAN NOTE
Well controlled   Continue CGM   He wants to be on mounjaro-- reports it works better than Metformin   Jardiance was too expensive         -- Medication Changes:   Continue Mounjaro 5 mg weekly    Jardiance was too expensive and unable to afford    Continue Morenita 2      -- Reviewed goals of therapy are to get the best control we can without hypoglycemia.  -- Advised frequent self blood glucose monitoring.  Patient encouraged to document glucose results and bring them to every clinic visit. Continue morenita 2   -- Hypoglycemia precautions discussed. Instructed on precautions before driving.    -- Call for Bg repeatedly < 70 or > 180.   -- Close adherence to lifestyle changes recommended.   -- Periodic follow ups for eye evaluations, foot care and dental care suggested.      Discussed colonoscopy

## 2024-08-27 NOTE — ASSESSMENT & PLAN NOTE
On statin per ADA recommendations  LDL goal < 70 (CVA hx)    Last LDL was at goal in April  Encouraged patient to restart the Crestor  Discussed ADA recommendations

## 2024-08-28 ENCOUNTER — PATIENT MESSAGE (OUTPATIENT)
Dept: DIABETES | Facility: CLINIC | Age: 54
End: 2024-08-28
Payer: COMMERCIAL

## 2024-09-06 ENCOUNTER — TELEPHONE (OUTPATIENT)
Dept: PHARMACY | Facility: CLINIC | Age: 54
End: 2024-09-06
Payer: COMMERCIAL

## 2024-09-06 NOTE — TELEPHONE ENCOUNTER
[facility]/Population Health Chart Review & Patient Outreach Details For Medication Adherence Project    Reason for Outreach Encounter: 3rd Party payor non-compliance report ([employer], [employer], [employer], etc)    2.  Patient Outreach Method: Reviewed patient chart     3.   Medication in question:   Hypertension Medications               lisinopriL (PRINIVIL,ZESTRIL) 20 MG tablet Take 1 tablet (20 mg total) by mouth once daily.              Hyperlipidemia Medications               rosuvastatin (CRESTOR) 20 MG tablet Take 1 tablet (20 mg total) by mouth once daily.                LAST FILLED: 8/27/24 for 90 day supply - rosuvastatin 20   Lisinopril 20 mg last filled 8/14/24 for 90 days    4.  Reviewed and or Updates Made To: Patient Chart    5. Outreach Outcomes and/or actions taken: Patient filled medication and is on track to be adherent    Additional Notes:

## 2024-09-09 ENCOUNTER — TELEPHONE (OUTPATIENT)
Dept: GASTROENTEROLOGY | Facility: CLINIC | Age: 54
End: 2024-09-09
Payer: COMMERCIAL

## 2024-09-09 NOTE — TELEPHONE ENCOUNTER
-Patient cancelled procedure on 9/23/2024 and he will call back when he can reschedule. Pt klhas the [facility] instructions in his portal he will need the medication reordered.      ---- Message from Tonny Wilcox sent at 9/9/2024  9:50 AM CDT -----  Type:  Patient Returning Call    Who Called:pt  Who Left Message for Patient:  Does the patient know what this is regarding?:procedure cancellation  Would the patient rather a call back or a response via [facility]? call  Best Call Back Number: [phone]  Additional Information: pt states he would like to cancel procedure for now until he can see when he can reschedule. Thank you

## 2024-09-19 ENCOUNTER — PATIENT MESSAGE (OUTPATIENT)
Dept: PRIMARY CARE CLINIC | Facility: CLINIC | Age: 54
End: 2024-09-19
Payer: COMMERCIAL

## 2024-09-25 ENCOUNTER — PATIENT MESSAGE (OUTPATIENT)
Dept: DIABETES | Facility: CLINIC | Age: 54
End: 2024-09-25
Payer: COMMERCIAL

## 2024-10-15 ENCOUNTER — PATIENT MESSAGE (OUTPATIENT)
Dept: DIABETES | Facility: CLINIC | Age: 54
End: 2024-10-15
Payer: COMMERCIAL

## 2024-10-28 ENCOUNTER — PATIENT MESSAGE (OUTPATIENT)
Dept: PRIMARY CARE CLINIC | Facility: CLINIC | Age: 54
End: 2024-10-28
Payer: COMMERCIAL

## 2024-11-05 ENCOUNTER — TELEPHONE (OUTPATIENT)
Dept: PHARMACY | Facility: CLINIC | Age: 54
End: 2024-11-05
Payer: COMMERCIAL

## 2024-11-06 NOTE — TELEPHONE ENCOUNTER
[facility]/Population Health Chart Review & Patient Outreach Details For Medication Adherence Project    Reason for Outreach Encounter: 3rd Party payor non-compliance report ([employer], [employer], [employer], etc)  2.  Patient Outreach Method: Reviewed patient chart  and [employer] message  3.   Medication in question:   Diabetes Medications               tirzepatide (MOUNJARO) 5 mg/0.5 mL PnIj Inject 5 mg into the skin every 7 days.                 MOunjaro  last filled  8/14/24 for 28 day supply      4.  Reviewed and or Updates Made To: Patient Chart  5. Outreach Outcomes and/or actions taken: Sent inquiry to patient: Waiting for response  Additional Notes:

## 2024-12-13 ENCOUNTER — TELEPHONE (OUTPATIENT)
Dept: GASTROENTEROLOGY | Facility: CLINIC | Age: 54
End: 2024-12-13
Payer: COMMERCIAL

## 2024-12-13 ENCOUNTER — TELEPHONE (OUTPATIENT)
Dept: PHARMACY | Facility: CLINIC | Age: 54
End: 2024-12-13
Payer: COMMERCIAL

## 2024-12-13 NOTE — TELEPHONE ENCOUNTER
Attempted to schedule pt for colonoscopy, pt stated he does not want to have colonoscopy done. Case request removed

## 2024-12-14 NOTE — TELEPHONE ENCOUNTER
[facility]/Population Health Chart Review & Patient Outreach Details For Medication Adherence Project    Reason for Outreach Encounter: 3rd Party payor non-compliance report ([employer], [employer], C, etc)  2.  Patient Outreach Method: Reviewed Patient Chart  3.   Medication in question: mounjaro    LAST FILLED: 10/25/24 for 84 day supply  Diabetes Medications               tirzepatide (MOUNJARO) 5 mg/0.5 mL PnIj Inject 5 mg into the skin every 7 days.              4.  Reviewed and or Updates Made To: Patient Chart  5. Outreach Outcomes and/or actions taken: Patient filled medication and is on track to be adherent

## 2025-01-15 ENCOUNTER — TELEPHONE (OUTPATIENT)
Dept: PHARMACY | Facility: CLINIC | Age: 55
End: 2025-01-15
Payer: COMMERCIAL

## 2025-01-15 NOTE — TELEPHONE ENCOUNTER
[facility]/Population Health Chart Review & Patient Outreach Details For Medication Adherence Project    Reason for Outreach Encounter: 3rd Party payor non-compliance report ([employer], [employer], [employer], etc)  2.  Patient Outreach Method: Reviewed Patient Chart  3.   Medication in question: rosuvastatin   LAST FILLED: 11/24/24 for 90 day supply  Hyperlipidemia Medications               rosuvastatin (CRESTOR) 20 MG tablet Take 1 tablet (20 mg total) by mouth once daily.               4.  Reviewed and or Updates Made To: Patient Chart  5. Outreach Outcomes and/or actions taken: Patient filled medication and is on track to be adherent

## 2025-01-23 RX ORDER — OMEPRAZOLE 40 MG/1
CAPSULE, DELAYED RELEASE ORAL
Qty: 30 CAPSULE | Refills: 5 | OUTPATIENT
Start: 2025-01-23

## 2025-01-23 NOTE — TELEPHONE ENCOUNTER
No care due was identified.  [facility] Embedded Care Due Messages. Reference number: [id number].   1/23/2025 3:25:23 AM CST

## 2025-01-23 NOTE — TELEPHONE ENCOUNTER
Refill Decision Note   Jasper No  is requesting a refill authorization.  Brief Assessment and Rationale for Refill:  Quick Discontinue     Medication Therapy Plan: [email or web address] (8/27/24)      Comments:     Note composed:8:41 AM 01/23/2025

## 2025-03-10 ENCOUNTER — TELEPHONE (OUTPATIENT)
Dept: DIABETES | Facility: CLINIC | Age: 55
End: 2025-03-10

## 2025-05-21 ENCOUNTER — PATIENT MESSAGE (OUTPATIENT)
Dept: ADMINISTRATIVE | Facility: HOSPITAL | Age: 55
End: 2025-05-21

## 2025-07-17 ENCOUNTER — TELEPHONE (OUTPATIENT)
Dept: DIABETES | Facility: CLINIC | Age: 55
End: 2025-07-17
Payer: COMMERCIAL

## 2025-07-17 NOTE — TELEPHONE ENCOUNTER
Called pt to schedule a follow up appt to discuss refills. Last appt was August of 2024 and he was suppose to f/u Feb 2025.

## 2025-08-02 DIAGNOSIS — E11.65 TYPE 2 DIABETES MELLITUS WITH HYPERGLYCEMIA, WITHOUT LONG-TERM CURRENT USE OF INSULIN: ICD-10-CM

## 2025-08-04 ENCOUNTER — TELEPHONE (OUTPATIENT)
Dept: DIABETES | Facility: CLINIC | Age: 55
End: 2025-08-04
Payer: COMMERCIAL

## 2025-08-04 RX ORDER — TIRZEPATIDE 5 MG/.5ML
5 INJECTION, SOLUTION SUBCUTANEOUS
Qty: 4 PEN | Refills: 1 | Status: SHIPPED | OUTPATIENT
Start: 2025-08-04

## 2025-08-04 NOTE — TELEPHONE ENCOUNTER
Called pt to schedule a f/u visit and to see if pt wants to increase mounjaro dose, no answer left VM

## 2025-08-04 NOTE — TELEPHONE ENCOUNTER
Scheduled pt for f/u visit 8/27 at 8am , lab prior   Copied from CRM #[id number]. Topic: Appointments - Appointment Access  >> Aug 4, 2025 10:56 AM Speedy wrote:  Patient is calling to schedule an appointment needing an 8 am appointment. Please contact pt

## 2025-08-04 NOTE — TELEPHONE ENCOUNTER
Please see the attached refill request. Tried calling pt to schedule a follow up visit and to see if he wants to increase dosage, no answer

## 2025-08-08 ENCOUNTER — TELEPHONE (OUTPATIENT)
Dept: DIABETES | Facility: CLINIC | Age: 55
End: 2025-08-08
Payer: COMMERCIAL

## 2025-08-21 ENCOUNTER — TELEPHONE (OUTPATIENT)
Dept: DIABETES | Facility: CLINIC | Age: 55
End: 2025-08-21
Payer: COMMERCIAL

## 2025-08-21 ENCOUNTER — PATIENT MESSAGE (OUTPATIENT)
Dept: DIABETES | Facility: CLINIC | Age: 55
End: 2025-08-21
Payer: COMMERCIAL

## 2025-08-26 ENCOUNTER — TELEPHONE (OUTPATIENT)
Dept: DIABETES | Facility: CLINIC | Age: 55
End: 2025-08-26
Payer: COMMERCIAL

## 2025-08-27 ENCOUNTER — OFFICE VISIT (OUTPATIENT)
Dept: DIABETES | Facility: CLINIC | Age: 55
End: 2025-08-27
Payer: COMMERCIAL

## 2025-08-27 VITALS
HEIGHT: 66 IN | SYSTOLIC BLOOD PRESSURE: 132 MMHG | OXYGEN SATURATION: 98 % | DIASTOLIC BLOOD PRESSURE: 84 MMHG | WEIGHT: 197.81 LBS | BODY MASS INDEX: 31.79 KG/M2 | HEART RATE: 68 BPM

## 2025-08-27 DIAGNOSIS — E11.65 TYPE 2 DIABETES MELLITUS WITH HYPERGLYCEMIA, WITHOUT LONG-TERM CURRENT USE OF INSULIN: Primary | ICD-10-CM

## 2025-08-27 DIAGNOSIS — Z91.199 NONCOMPLIANCE WITH DIABETES TREATMENT: ICD-10-CM

## 2025-08-27 DIAGNOSIS — Z59.9 FINANCIAL DIFFICULTIES: ICD-10-CM

## 2025-08-27 DIAGNOSIS — E66.09 CLASS 1 OBESITY DUE TO EXCESS CALORIES WITH SERIOUS COMORBIDITY AND BODY MASS INDEX (BMI) OF 31.0 TO 31.9 IN ADULT: ICD-10-CM

## 2025-08-27 DIAGNOSIS — E11.69 TYPE 2 DIABETES MELLITUS WITH HYPERLIPIDEMIA: ICD-10-CM

## 2025-08-27 DIAGNOSIS — E11.69 HYPERLIPIDEMIA ASSOCIATED WITH TYPE 2 DIABETES MELLITUS: ICD-10-CM

## 2025-08-27 DIAGNOSIS — I10 ESSENTIAL HYPERTENSION: ICD-10-CM

## 2025-08-27 DIAGNOSIS — E78.5 TYPE 2 DIABETES MELLITUS WITH HYPERLIPIDEMIA: ICD-10-CM

## 2025-08-27 DIAGNOSIS — Z91.148 NONCOMPLIANCE W/MEDICATION TREATMENT DUE TO INTERMIT USE OF MEDICATION: ICD-10-CM

## 2025-08-27 DIAGNOSIS — E66.811 CLASS 1 OBESITY DUE TO EXCESS CALORIES WITH SERIOUS COMORBIDITY AND BODY MASS INDEX (BMI) OF 31.0 TO 31.9 IN ADULT: ICD-10-CM

## 2025-08-27 DIAGNOSIS — Z86.79 HISTORY OF CEREBRAL ANEURYSM: ICD-10-CM

## 2025-08-27 DIAGNOSIS — Z86.73 HISTORY OF STROKE: ICD-10-CM

## 2025-08-27 DIAGNOSIS — E78.5 HYPERLIPIDEMIA ASSOCIATED WITH TYPE 2 DIABETES MELLITUS: ICD-10-CM

## 2025-08-27 DIAGNOSIS — Z71.9 HEALTH EDUCATION/COUNSELING: ICD-10-CM

## 2025-08-27 LAB — GLUCOSE SERPL-MCNC: 148 MG/DL (ref 70–110)

## 2025-08-27 PROCEDURE — 82962 GLUCOSE BLOOD TEST: CPT | Mod: S$GLB,,, | Performed by: NURSE PRACTITIONER

## 2025-08-27 PROCEDURE — 3046F HEMOGLOBIN A1C LEVEL >9.0%: CPT | Mod: CPTII,S$GLB,, | Performed by: NURSE PRACTITIONER

## 2025-08-27 PROCEDURE — 3066F NEPHROPATHY DOC TX: CPT | Mod: CPTII,S$GLB,, | Performed by: NURSE PRACTITIONER

## 2025-08-27 PROCEDURE — 3061F NEG MICROALBUMINURIA REV: CPT | Mod: CPTII,S$GLB,, | Performed by: NURSE PRACTITIONER

## 2025-08-27 PROCEDURE — 99999 PR PBB SHADOW E&M-EST. PATIENT-LVL IV: CPT | Mod: PBBFAC,,, | Performed by: NURSE PRACTITIONER

## 2025-08-27 PROCEDURE — 99214 OFFICE O/P EST MOD 30 MIN: CPT | Mod: S$GLB,,, | Performed by: NURSE PRACTITIONER

## 2025-08-27 PROCEDURE — 3079F DIAST BP 80-89 MM HG: CPT | Mod: CPTII,S$GLB,, | Performed by: NURSE PRACTITIONER

## 2025-08-27 PROCEDURE — 1160F RVW MEDS BY RX/DR IN RCRD: CPT | Mod: CPTII,S$GLB,, | Performed by: NURSE PRACTITIONER

## 2025-08-27 PROCEDURE — 1159F MED LIST DOCD IN RCRD: CPT | Mod: CPTII,S$GLB,, | Performed by: NURSE PRACTITIONER

## 2025-08-27 PROCEDURE — 3008F BODY MASS INDEX DOCD: CPT | Mod: CPTII,S$GLB,, | Performed by: NURSE PRACTITIONER

## 2025-08-27 PROCEDURE — 4010F ACE/ARB THERAPY RXD/TAKEN: CPT | Mod: CPTII,S$GLB,, | Performed by: NURSE PRACTITIONER

## 2025-08-27 PROCEDURE — 3075F SYST BP GE 130 - 139MM HG: CPT | Mod: CPTII,S$GLB,, | Performed by: NURSE PRACTITIONER

## 2025-08-27 RX ORDER — TIRZEPATIDE 5 MG/.5ML
5 INJECTION, SOLUTION SUBCUTANEOUS
Qty: 4 PEN | Refills: 6 | Status: SHIPPED | OUTPATIENT
Start: 2025-08-27

## 2025-08-27 RX ORDER — FLASH GLUCOSE SENSOR
1 KIT MISCELLANEOUS
Qty: 2 KIT | Refills: 11 | Status: SHIPPED | OUTPATIENT
Start: 2025-08-27

## 2025-08-27 RX ORDER — LISINOPRIL 20 MG/1
20 TABLET ORAL DAILY
Qty: 90 TABLET | Refills: 3 | Status: SHIPPED | OUTPATIENT
Start: 2025-08-27 | End: 2026-08-27

## 2025-08-27 RX ORDER — ROSUVASTATIN CALCIUM 20 MG/1
20 TABLET, COATED ORAL DAILY
Qty: 90 TABLET | Refills: 3 | Status: SHIPPED | OUTPATIENT
Start: 2025-08-27 | End: 2026-08-27

## 2025-08-27 SDOH — SOCIAL DETERMINANTS OF HEALTH (SDOH): PROBLEM RELATED TO HOUSING AND ECONOMIC CIRCUMSTANCES, UNSPECIFIED: Z59.9
